# Patient Record
Sex: FEMALE | Race: WHITE | NOT HISPANIC OR LATINO | URBAN - METROPOLITAN AREA
[De-identification: names, ages, dates, MRNs, and addresses within clinical notes are randomized per-mention and may not be internally consistent; named-entity substitution may affect disease eponyms.]

---

## 2020-04-22 ENCOUNTER — TELEPHONE (OUTPATIENT)
Dept: OBSTETRICS AND GYNECOLOGY | Facility: CLINIC | Age: 35
End: 2020-04-22

## 2020-04-22 NOTE — TELEPHONE ENCOUNTER
----- Message from Iwona Iraheta sent at 4/22/2020 10:37 AM CDT -----  Contact: Patient  Patient calling regarding refill  Of meds. Call back [phone].

## 2020-04-28 RX ORDER — NORETHINDRONE ACETATE AND ETHINYL ESTRADIOL AND FERROUS FUMARATE 1MG-20(24)
1 KIT ORAL DAILY
COMMUNITY
End: 2020-04-28 | Stop reason: SDUPTHER

## 2020-04-28 RX ORDER — NORETHINDRONE ACETATE AND ETHINYL ESTRADIOL AND FERROUS FUMARATE 1MG-20(24)
1 KIT ORAL DAILY
Qty: 84 TABLET | Refills: 1 | Status: SHIPPED | OUTPATIENT
Start: 2020-04-28 | End: 2021-01-08

## 2020-04-28 NOTE — TELEPHONE ENCOUNTER
----- Message from Arpita Ramos sent at 4/28/2020  8:08 AM CDT -----  Contact: Patient   .Type:  RX Refill Request    Who Called:  Patient  Refill or New Rx: refill   RX Name and Strength: BC  How is the patient currently taking it? (ex. 1XDay): 1x daily   Is this a 30 day or 90 day RX: 90  Preferred Pharmacy with phone number: [facility] , [facility] did not want to fill it     Local or Mail Order: local   Ordering Provider: Jennie Doan   Would the patient rather a call back or a response via [facility]? call  Best Call Back Number: [phone]  Additional Information: n/a

## 2021-01-06 DIAGNOSIS — Z34.81 ENCOUNTER FOR SUPERVISION OF NORMAL PREGNANCY IN MULTIGRAVIDA IN FIRST TRIMESTER: Primary | ICD-10-CM

## 2021-01-08 ENCOUNTER — PROCEDURE VISIT (OUTPATIENT)
Dept: OBSTETRICS AND GYNECOLOGY | Facility: CLINIC | Age: 36
End: 2021-01-08
Payer: COMMERCIAL

## 2021-01-08 DIAGNOSIS — Z34.81 ENCOUNTER FOR SUPERVISION OF NORMAL PREGNANCY IN MULTIGRAVIDA IN FIRST TRIMESTER: ICD-10-CM

## 2021-01-08 DIAGNOSIS — Z34.81 ENCOUNTER FOR SUPERVISION OF NORMAL PREGNANCY IN MULTIGRAVIDA IN FIRST TRIMESTER: Primary | ICD-10-CM

## 2021-01-08 LAB
ABS NRBC COUNT: 0 X 10 3/UL (ref 0–0.01)
ABSOLUTE BASOPHIL: 0.1 X 10 3/UL (ref 0–0.22)
ABSOLUTE EOSINOPHIL: 0.22 X 10 3/UL (ref 0.04–0.54)
ABSOLUTE IMMATURE GRAN: 0.04 X 10 3/UL (ref 0–0.04)
ABSOLUTE LYMPHOCYTE: 3.05 X 10 3/UL (ref 0.86–4.75)
ABSOLUTE MONOCYTE: 0.81 X 10 3/UL (ref 0.22–1.08)
ANTIBODY SCREEN: NEGATIVE
BASOPHILS NFR BLD: 1 % (ref 0.2–1.2)
BLOOD GROUPING: NORMAL
BLOOD TYPE (D): POSITIVE
EOSINOPHIL NFR BLD: 2.1 % (ref 0.7–7)
HBV SURFACE AG SERPL QL IA: NONREACTIVE
HCT VFR BLD AUTO: 43.5 % (ref 37–47)
HCV IGG SERPL QL IA: NONREACTIVE
HGB BLD-MCNC: 14.1 G/DL (ref 12–16)
HIV 1+2 AB+HIV1 P24 AG SERPL QL IA: NONREACTIVE
IMMATURE GRANULOCYTES: 0.4 % (ref 0–0.5)
LYMPHOCYTES NFR BLD: 29.1 % (ref 19.3–53.1)
MCH RBC QN AUTO: 29.5 PG (ref 27–32)
MCHC RBC AUTO-ENTMCNC: 32.4 G/DL (ref 32–36)
MCV RBC AUTO: 91 FL (ref 82–100)
MONOCYTES NFR BLD: 7.7 % (ref 4.7–12.5)
NEUTROPHILS # BLD AUTO: 6.25 X 10 3/UL (ref 2.15–7.56)
NEUTROPHILS NFR BLD: 59.7 %
NUCLEATED RED BLOOD CELLS: 0 /100 WBC (ref 0–0.2)
PLATELET # BLD AUTO: 351 X 10 3/UL (ref 135–400)
RBC # BLD AUTO: 4.78 X 10 6/UL (ref 4.2–5.4)
RDW-SD: 39.7 FL (ref 37–54)
RUBELLA IGG SCREEN: NORMAL
SICKLE CELL PREP: NEGATIVE
SYPHILIS TREPONEMAL ANTIBODY: NONREACTIVE
WBC # BLD: 10.47 X 10 3/UL (ref 4.3–10.8)

## 2021-01-08 PROCEDURE — 76817 PR US, OB, TRANSVAG APPROACH: ICD-10-PCS | Mod: S$GLB,,, | Performed by: OBSTETRICS & GYNECOLOGY

## 2021-01-08 PROCEDURE — 76817 TRANSVAGINAL US OBSTETRIC: CPT | Mod: S$GLB,,, | Performed by: OBSTETRICS & GYNECOLOGY

## 2021-01-08 RX ORDER — METFORMIN HYDROCHLORIDE 500 MG/1
500 TABLET, EXTENDED RELEASE ORAL
COMMUNITY
Start: 2020-12-06 | End: 2021-11-12 | Stop reason: SDUPTHER

## 2021-01-15 ENCOUNTER — ROUTINE PRENATAL (OUTPATIENT)
Dept: OBSTETRICS AND GYNECOLOGY | Facility: CLINIC | Age: 36
End: 2021-01-15
Payer: COMMERCIAL

## 2021-01-15 ENCOUNTER — PATIENT MESSAGE (OUTPATIENT)
Dept: OBSTETRICS AND GYNECOLOGY | Facility: CLINIC | Age: 36
End: 2021-01-15

## 2021-01-15 VITALS — DIASTOLIC BLOOD PRESSURE: 98 MMHG | WEIGHT: 233 LBS | SYSTOLIC BLOOD PRESSURE: 159 MMHG

## 2021-01-15 DIAGNOSIS — Z34.81 ENCOUNTER FOR SUPERVISION OF NORMAL PREGNANCY IN MULTIGRAVIDA IN FIRST TRIMESTER: Primary | ICD-10-CM

## 2021-01-15 DIAGNOSIS — O16.9 ELEVATED BLOOD PRESSURE AFFECTING PREGNANCY, ANTEPARTUM: ICD-10-CM

## 2021-01-15 DIAGNOSIS — O09.529 ADVANCED MATERNAL AGE IN MULTIGRAVIDA, UNSPECIFIED TRIMESTER: ICD-10-CM

## 2021-01-15 DIAGNOSIS — Z86.39 HISTORY OF DIABETES MELLITUS: ICD-10-CM

## 2021-01-15 LAB — GLUCOSE 1 HR POST 50 GM: 248 MG/DL

## 2021-01-15 PROCEDURE — 0500F INITIAL PRENATAL CARE VISIT: CPT | Mod: S$GLB,,, | Performed by: OBSTETRICS & GYNECOLOGY

## 2021-01-15 PROCEDURE — 0500F PR INITIAL PRENATAL CARE VISIT: ICD-10-PCS | Mod: S$GLB,,, | Performed by: OBSTETRICS & GYNECOLOGY

## 2021-01-18 ENCOUNTER — PATIENT MESSAGE (OUTPATIENT)
Dept: OBSTETRICS AND GYNECOLOGY | Facility: CLINIC | Age: 36
End: 2021-01-18

## 2021-01-19 DIAGNOSIS — O16.9 ELEVATED BLOOD PRESSURE AFFECTING PREGNANCY, ANTEPARTUM: Primary | ICD-10-CM

## 2021-01-19 RX ORDER — NIFEDIPINE 30 MG/1
30 TABLET, EXTENDED RELEASE ORAL DAILY
Qty: 30 TABLET | Refills: 11 | Status: SHIPPED | OUTPATIENT
Start: 2021-01-19 | End: 2021-02-23 | Stop reason: SDUPTHER

## 2021-01-20 LAB
CHLAMYDIA: NEGATIVE
GONORRHEA: NEGATIVE
SOURCE: NORMAL
SOURCE: NORMAL
TRICHOMONAS AMPLIFIED: NEGATIVE

## 2021-01-21 LAB — URINE CULTURE, ROUTINE: NORMAL

## 2021-01-25 ENCOUNTER — PATIENT MESSAGE (OUTPATIENT)
Dept: OBSTETRICS AND GYNECOLOGY | Facility: CLINIC | Age: 36
End: 2021-01-25

## 2021-01-27 ENCOUNTER — PATIENT MESSAGE (OUTPATIENT)
Dept: OBSTETRICS AND GYNECOLOGY | Facility: CLINIC | Age: 36
End: 2021-01-27

## 2021-02-16 ENCOUNTER — PATIENT MESSAGE (OUTPATIENT)
Dept: OBSTETRICS AND GYNECOLOGY | Facility: CLINIC | Age: 36
End: 2021-02-16

## 2021-02-22 ENCOUNTER — ROUTINE PRENATAL (OUTPATIENT)
Dept: OBSTETRICS AND GYNECOLOGY | Facility: CLINIC | Age: 36
End: 2021-02-22
Payer: COMMERCIAL

## 2021-02-22 ENCOUNTER — PATIENT MESSAGE (OUTPATIENT)
Dept: OBSTETRICS AND GYNECOLOGY | Facility: CLINIC | Age: 36
End: 2021-02-22

## 2021-02-22 VITALS — SYSTOLIC BLOOD PRESSURE: 132 MMHG | HEART RATE: 89 BPM | DIASTOLIC BLOOD PRESSURE: 84 MMHG | WEIGHT: 227 LBS

## 2021-02-22 DIAGNOSIS — O16.9 ELEVATED BLOOD PRESSURE AFFECTING PREGNANCY, ANTEPARTUM: ICD-10-CM

## 2021-02-22 DIAGNOSIS — Z34.81 ENCOUNTER FOR SUPERVISION OF NORMAL PREGNANCY IN MULTIGRAVIDA IN FIRST TRIMESTER: Primary | ICD-10-CM

## 2021-02-22 DIAGNOSIS — O09.529 ADVANCED MATERNAL AGE IN MULTIGRAVIDA, UNSPECIFIED TRIMESTER: ICD-10-CM

## 2021-02-22 DIAGNOSIS — Z86.39 HISTORY OF DIABETES MELLITUS: ICD-10-CM

## 2021-02-22 PROCEDURE — 0502F PR SUBSEQUENT PRENATAL CARE: ICD-10-PCS | Mod: CPTII,S$GLB,, | Performed by: OBSTETRICS & GYNECOLOGY

## 2021-02-22 PROCEDURE — 0502F SUBSEQUENT PRENATAL CARE: CPT | Mod: CPTII,S$GLB,, | Performed by: OBSTETRICS & GYNECOLOGY

## 2021-02-23 DIAGNOSIS — O16.9 ELEVATED BLOOD PRESSURE AFFECTING PREGNANCY, ANTEPARTUM: ICD-10-CM

## 2021-02-23 RX ORDER — NIFEDIPINE 30 MG/1
30 TABLET, EXTENDED RELEASE ORAL DAILY
Qty: 90 TABLET | Refills: 4 | Status: SHIPPED | OUTPATIENT
Start: 2021-02-23 | End: 2021-05-24

## 2021-03-03 ENCOUNTER — PATIENT MESSAGE (OUTPATIENT)
Dept: OBSTETRICS AND GYNECOLOGY | Facility: CLINIC | Age: 36
End: 2021-03-03

## 2021-03-05 ENCOUNTER — ROUTINE PRENATAL (OUTPATIENT)
Dept: OBSTETRICS AND GYNECOLOGY | Facility: CLINIC | Age: 36
End: 2021-03-05
Payer: COMMERCIAL

## 2021-03-05 ENCOUNTER — PATIENT MESSAGE (OUTPATIENT)
Dept: OBSTETRICS AND GYNECOLOGY | Facility: CLINIC | Age: 36
End: 2021-03-05

## 2021-03-05 VITALS — WEIGHT: 225 LBS | DIASTOLIC BLOOD PRESSURE: 84 MMHG | SYSTOLIC BLOOD PRESSURE: 137 MMHG | HEART RATE: 90 BPM

## 2021-03-05 DIAGNOSIS — Z34.02 ENCOUNTER FOR SUPERVISION OF NORMAL FIRST PREGNANCY IN SECOND TRIMESTER: ICD-10-CM

## 2021-03-05 DIAGNOSIS — Z86.39 HISTORY OF DIABETES MELLITUS: ICD-10-CM

## 2021-03-05 DIAGNOSIS — O16.9 ELEVATED BLOOD PRESSURE AFFECTING PREGNANCY, ANTEPARTUM: ICD-10-CM

## 2021-03-05 DIAGNOSIS — O09.529 ADVANCED MATERNAL AGE IN MULTIGRAVIDA, UNSPECIFIED TRIMESTER: Primary | ICD-10-CM

## 2021-03-05 PROCEDURE — 0502F PR SUBSEQUENT PRENATAL CARE: ICD-10-PCS | Mod: CPTII,S$GLB,, | Performed by: OBSTETRICS & GYNECOLOGY

## 2021-03-05 PROCEDURE — 0502F SUBSEQUENT PRENATAL CARE: CPT | Mod: CPTII,S$GLB,, | Performed by: OBSTETRICS & GYNECOLOGY

## 2021-03-05 RX ORDER — INSULIN HUMAN 100 [IU]/ML
INJECTION, SUSPENSION SUBCUTANEOUS
Qty: 10 ML | Refills: 3 | Status: SHIPPED | OUTPATIENT
Start: 2021-03-05 | End: 2021-03-05

## 2021-03-05 RX ORDER — NAPROXEN SODIUM 220 MG/1
81 TABLET, FILM COATED ORAL DAILY
COMMUNITY

## 2021-03-09 ENCOUNTER — PATIENT MESSAGE (OUTPATIENT)
Dept: OBSTETRICS AND GYNECOLOGY | Facility: CLINIC | Age: 36
End: 2021-03-09

## 2021-03-12 ENCOUNTER — TELEPHONE (OUTPATIENT)
Dept: OBSTETRICS AND GYNECOLOGY | Facility: CLINIC | Age: 36
End: 2021-03-12

## 2021-03-16 ENCOUNTER — PATIENT MESSAGE (OUTPATIENT)
Dept: OBSTETRICS AND GYNECOLOGY | Facility: CLINIC | Age: 36
End: 2021-03-16

## 2021-03-16 ENCOUNTER — OFFICE VISIT (OUTPATIENT)
Dept: OBSTETRICS AND GYNECOLOGY | Facility: CLINIC | Age: 36
End: 2021-03-16
Payer: COMMERCIAL

## 2021-03-16 DIAGNOSIS — O09.529 ADVANCED MATERNAL AGE IN MULTIGRAVIDA, UNSPECIFIED TRIMESTER: Primary | ICD-10-CM

## 2021-03-16 DIAGNOSIS — Z34.02 ENCOUNTER FOR SUPERVISION OF NORMAL FIRST PREGNANCY IN SECOND TRIMESTER: ICD-10-CM

## 2021-03-16 DIAGNOSIS — Z86.39 HISTORY OF DIABETES MELLITUS: ICD-10-CM

## 2021-03-16 DIAGNOSIS — O16.9 ELEVATED BLOOD PRESSURE AFFECTING PREGNANCY, ANTEPARTUM: ICD-10-CM

## 2021-03-16 PROCEDURE — 0502F SUBSEQUENT PRENATAL CARE: CPT | Mod: CPTII,,, | Performed by: OBSTETRICS & GYNECOLOGY

## 2021-03-16 PROCEDURE — 0502F PR SUBSEQUENT PRENATAL CARE: ICD-10-PCS | Mod: CPTII,,, | Performed by: OBSTETRICS & GYNECOLOGY

## 2021-03-22 ENCOUNTER — PATIENT MESSAGE (OUTPATIENT)
Dept: OBSTETRICS AND GYNECOLOGY | Facility: CLINIC | Age: 36
End: 2021-03-22

## 2021-03-29 DIAGNOSIS — O24.414 INSULIN CONTROLLED GESTATIONAL DIABETES MELLITUS (GDM) IN SECOND TRIMESTER: Primary | ICD-10-CM

## 2021-04-01 ENCOUNTER — OFFICE VISIT (OUTPATIENT)
Dept: MATERNAL FETAL MEDICINE | Facility: CLINIC | Age: 36
End: 2021-04-01
Payer: COMMERCIAL

## 2021-04-01 VITALS
BODY MASS INDEX: 34.84 KG/M2 | SYSTOLIC BLOOD PRESSURE: 120 MMHG | HEART RATE: 88 BPM | DIASTOLIC BLOOD PRESSURE: 80 MMHG | HEIGHT: 67 IN | WEIGHT: 222 LBS | OXYGEN SATURATION: 98 % | RESPIRATION RATE: 20 BRPM

## 2021-04-01 DIAGNOSIS — O24.414 INSULIN CONTROLLED GESTATIONAL DIABETES MELLITUS (GDM) IN SECOND TRIMESTER: ICD-10-CM

## 2021-04-01 PROCEDURE — 76811 PR US, OB FETAL EVAL & EXAM, TRANSABDOM,FIRST GESTATION: ICD-10-PCS | Mod: S$GLB,,, | Performed by: OBSTETRICS & GYNECOLOGY

## 2021-04-01 PROCEDURE — 99205 PR OFFICE/OUTPT VISIT, NEW, LEVL V, 60-74 MIN: ICD-10-PCS | Mod: 25,S$GLB,, | Performed by: OBSTETRICS & GYNECOLOGY

## 2021-04-01 PROCEDURE — 3008F BODY MASS INDEX DOCD: CPT | Mod: CPTII,S$GLB,, | Performed by: OBSTETRICS & GYNECOLOGY

## 2021-04-01 PROCEDURE — 99205 OFFICE O/P NEW HI 60 MIN: CPT | Mod: 25,S$GLB,, | Performed by: OBSTETRICS & GYNECOLOGY

## 2021-04-01 PROCEDURE — 76811 OB US DETAILED SNGL FETUS: CPT | Mod: S$GLB,,, | Performed by: OBSTETRICS & GYNECOLOGY

## 2021-04-01 PROCEDURE — 3008F PR BODY MASS INDEX (BMI) DOCUMENTED: ICD-10-PCS | Mod: CPTII,S$GLB,, | Performed by: OBSTETRICS & GYNECOLOGY

## 2021-04-09 ENCOUNTER — PATIENT MESSAGE (OUTPATIENT)
Dept: OBSTETRICS AND GYNECOLOGY | Facility: CLINIC | Age: 36
End: 2021-04-09

## 2021-04-09 DIAGNOSIS — Z86.39 HISTORY OF DIABETES MELLITUS: ICD-10-CM

## 2021-04-09 RX ORDER — HUMAN INSULIN 100 [IU]/ML
INJECTION, SUSPENSION SUBCUTANEOUS
Qty: 1 VIAL | Refills: 3 | Status: SHIPPED | OUTPATIENT
Start: 2021-04-09 | End: 2021-04-26 | Stop reason: SDUPTHER

## 2021-04-14 ENCOUNTER — TELEPHONE (OUTPATIENT)
Dept: OBSTETRICS AND GYNECOLOGY | Facility: CLINIC | Age: 36
End: 2021-04-14

## 2021-04-15 ENCOUNTER — ROUTINE PRENATAL (OUTPATIENT)
Dept: OBSTETRICS AND GYNECOLOGY | Facility: CLINIC | Age: 36
End: 2021-04-15
Payer: COMMERCIAL

## 2021-04-15 VITALS
WEIGHT: 222 LBS | HEART RATE: 91 BPM | BODY MASS INDEX: 34.77 KG/M2 | DIASTOLIC BLOOD PRESSURE: 85 MMHG | SYSTOLIC BLOOD PRESSURE: 147 MMHG

## 2021-04-15 DIAGNOSIS — O09.529 ADVANCED MATERNAL AGE IN MULTIGRAVIDA, UNSPECIFIED TRIMESTER: ICD-10-CM

## 2021-04-15 DIAGNOSIS — Z34.02 ENCOUNTER FOR SUPERVISION OF NORMAL FIRST PREGNANCY IN SECOND TRIMESTER: Primary | ICD-10-CM

## 2021-04-15 DIAGNOSIS — Z86.39 HISTORY OF DIABETES MELLITUS: ICD-10-CM

## 2021-04-15 DIAGNOSIS — O16.9 ELEVATED BLOOD PRESSURE AFFECTING PREGNANCY, ANTEPARTUM: ICD-10-CM

## 2021-04-15 PROCEDURE — 0502F SUBSEQUENT PRENATAL CARE: CPT | Mod: CPTII,S$GLB,, | Performed by: OBSTETRICS & GYNECOLOGY

## 2021-04-15 PROCEDURE — 0502F PR SUBSEQUENT PRENATAL CARE: ICD-10-PCS | Mod: CPTII,S$GLB,, | Performed by: OBSTETRICS & GYNECOLOGY

## 2021-04-20 ENCOUNTER — PATIENT MESSAGE (OUTPATIENT)
Dept: OBSTETRICS AND GYNECOLOGY | Facility: CLINIC | Age: 36
End: 2021-04-20

## 2021-04-26 ENCOUNTER — PATIENT MESSAGE (OUTPATIENT)
Dept: OBSTETRICS AND GYNECOLOGY | Facility: CLINIC | Age: 36
End: 2021-04-26

## 2021-04-26 DIAGNOSIS — Z86.39 HISTORY OF DIABETES MELLITUS: ICD-10-CM

## 2021-04-26 DIAGNOSIS — O24.414 INSULIN CONTROLLED GESTATIONAL DIABETES MELLITUS (GDM) IN SECOND TRIMESTER: Primary | ICD-10-CM

## 2021-04-27 RX ORDER — HUMAN INSULIN 100 [IU]/ML
INJECTION, SUSPENSION SUBCUTANEOUS
Qty: 1 VIAL | Refills: 3 | Status: SHIPPED | OUTPATIENT
Start: 2021-04-27 | End: 2023-10-12

## 2021-04-29 ENCOUNTER — PROCEDURE VISIT (OUTPATIENT)
Dept: MATERNAL FETAL MEDICINE | Facility: CLINIC | Age: 36
End: 2021-04-29
Payer: COMMERCIAL

## 2021-04-29 VITALS
OXYGEN SATURATION: 99 % | RESPIRATION RATE: 20 BRPM | BODY MASS INDEX: 34.77 KG/M2 | WEIGHT: 222 LBS | SYSTOLIC BLOOD PRESSURE: 130 MMHG | DIASTOLIC BLOOD PRESSURE: 60 MMHG | HEART RATE: 100 BPM

## 2021-04-29 DIAGNOSIS — O24.414 INSULIN CONTROLLED GESTATIONAL DIABETES MELLITUS (GDM) IN SECOND TRIMESTER: ICD-10-CM

## 2021-04-29 PROCEDURE — 99213 PR OFFICE/OUTPT VISIT, EST, LEVL III, 20-29 MIN: ICD-10-PCS | Mod: 25,S$GLB,, | Performed by: OBSTETRICS & GYNECOLOGY

## 2021-04-29 PROCEDURE — 76816 PR  US,PREGNANT UTERUS,F/U,TRANSABD APP: ICD-10-PCS | Mod: S$GLB,,, | Performed by: OBSTETRICS & GYNECOLOGY

## 2021-04-29 PROCEDURE — 76816 OB US FOLLOW-UP PER FETUS: CPT | Mod: S$GLB,,, | Performed by: OBSTETRICS & GYNECOLOGY

## 2021-04-29 PROCEDURE — 99213 OFFICE O/P EST LOW 20 MIN: CPT | Mod: 25,S$GLB,, | Performed by: OBSTETRICS & GYNECOLOGY

## 2021-05-04 ENCOUNTER — TELEPHONE (OUTPATIENT)
Dept: OBSTETRICS AND GYNECOLOGY | Facility: CLINIC | Age: 36
End: 2021-05-04

## 2021-05-05 ENCOUNTER — ROUTINE PRENATAL (OUTPATIENT)
Dept: OBSTETRICS AND GYNECOLOGY | Facility: CLINIC | Age: 36
End: 2021-05-05
Payer: COMMERCIAL

## 2021-05-05 VITALS
WEIGHT: 225 LBS | SYSTOLIC BLOOD PRESSURE: 137 MMHG | BODY MASS INDEX: 35.24 KG/M2 | DIASTOLIC BLOOD PRESSURE: 97 MMHG | HEART RATE: 91 BPM

## 2021-05-05 DIAGNOSIS — O16.9 ELEVATED BLOOD PRESSURE AFFECTING PREGNANCY, ANTEPARTUM: ICD-10-CM

## 2021-05-05 DIAGNOSIS — O09.529 ADVANCED MATERNAL AGE IN MULTIGRAVIDA, UNSPECIFIED TRIMESTER: ICD-10-CM

## 2021-05-05 DIAGNOSIS — Z86.39 HISTORY OF DIABETES MELLITUS: ICD-10-CM

## 2021-05-05 DIAGNOSIS — Z34.02 ENCOUNTER FOR SUPERVISION OF NORMAL FIRST PREGNANCY IN SECOND TRIMESTER: Primary | ICD-10-CM

## 2021-05-05 PROCEDURE — 0502F SUBSEQUENT PRENATAL CARE: CPT | Mod: CPTII,S$GLB,, | Performed by: OBSTETRICS & GYNECOLOGY

## 2021-05-05 PROCEDURE — 0502F PR SUBSEQUENT PRENATAL CARE: ICD-10-PCS | Mod: CPTII,S$GLB,, | Performed by: OBSTETRICS & GYNECOLOGY

## 2021-05-10 ENCOUNTER — PATIENT MESSAGE (OUTPATIENT)
Dept: OBSTETRICS AND GYNECOLOGY | Facility: CLINIC | Age: 36
End: 2021-05-10

## 2021-05-24 DIAGNOSIS — O24.414 INSULIN CONTROLLED GESTATIONAL DIABETES MELLITUS (GDM) IN SECOND TRIMESTER: Primary | ICD-10-CM

## 2021-05-27 ENCOUNTER — ROUTINE PRENATAL (OUTPATIENT)
Dept: OBSTETRICS AND GYNECOLOGY | Facility: CLINIC | Age: 36
End: 2021-05-27
Payer: COMMERCIAL

## 2021-05-27 ENCOUNTER — PROCEDURE VISIT (OUTPATIENT)
Dept: MATERNAL FETAL MEDICINE | Facility: CLINIC | Age: 36
End: 2021-05-27
Payer: COMMERCIAL

## 2021-05-27 VITALS
OXYGEN SATURATION: 99 % | HEART RATE: 94 BPM | WEIGHT: 226 LBS | WEIGHT: 226 LBS | DIASTOLIC BLOOD PRESSURE: 76 MMHG | DIASTOLIC BLOOD PRESSURE: 76 MMHG | RESPIRATION RATE: 20 BRPM | HEART RATE: 94 BPM | BODY MASS INDEX: 35.4 KG/M2 | BODY MASS INDEX: 35.4 KG/M2 | SYSTOLIC BLOOD PRESSURE: 120 MMHG | SYSTOLIC BLOOD PRESSURE: 120 MMHG

## 2021-05-27 DIAGNOSIS — O24.414 INSULIN CONTROLLED GESTATIONAL DIABETES MELLITUS (GDM) IN SECOND TRIMESTER: ICD-10-CM

## 2021-05-27 DIAGNOSIS — Z34.02 ENCOUNTER FOR SUPERVISION OF NORMAL FIRST PREGNANCY IN SECOND TRIMESTER: Primary | ICD-10-CM

## 2021-05-27 DIAGNOSIS — Z86.39 HISTORY OF DIABETES MELLITUS: ICD-10-CM

## 2021-05-27 DIAGNOSIS — O09.529 ADVANCED MATERNAL AGE IN MULTIGRAVIDA, UNSPECIFIED TRIMESTER: ICD-10-CM

## 2021-05-27 DIAGNOSIS — O16.9 ELEVATED BLOOD PRESSURE AFFECTING PREGNANCY, ANTEPARTUM: ICD-10-CM

## 2021-05-27 PROCEDURE — 99213 PR OFFICE/OUTPT VISIT, EST, LEVL III, 20-29 MIN: ICD-10-PCS | Mod: 25,S$GLB,, | Performed by: OBSTETRICS & GYNECOLOGY

## 2021-05-27 PROCEDURE — 99213 OFFICE O/P EST LOW 20 MIN: CPT | Mod: 25,S$GLB,, | Performed by: OBSTETRICS & GYNECOLOGY

## 2021-05-27 PROCEDURE — 76816 PR  US,PREGNANT UTERUS,F/U,TRANSABD APP: ICD-10-PCS | Mod: S$GLB,,, | Performed by: OBSTETRICS & GYNECOLOGY

## 2021-05-27 PROCEDURE — 76819 FETAL BIOPHYS PROFIL W/O NST: CPT | Mod: S$GLB,,, | Performed by: OBSTETRICS & GYNECOLOGY

## 2021-05-27 PROCEDURE — 0502F PR SUBSEQUENT PRENATAL CARE: ICD-10-PCS | Mod: CPTII,S$GLB,, | Performed by: OBSTETRICS & GYNECOLOGY

## 2021-05-27 PROCEDURE — 76816 OB US FOLLOW-UP PER FETUS: CPT | Mod: S$GLB,,, | Performed by: OBSTETRICS & GYNECOLOGY

## 2021-05-27 PROCEDURE — 0502F SUBSEQUENT PRENATAL CARE: CPT | Mod: CPTII,S$GLB,, | Performed by: OBSTETRICS & GYNECOLOGY

## 2021-05-27 PROCEDURE — 76819 PR US, OB, FETAL BIOPHYSICAL, W/O NST: ICD-10-PCS | Mod: S$GLB,,, | Performed by: OBSTETRICS & GYNECOLOGY

## 2021-06-15 DIAGNOSIS — O24.414 INSULIN CONTROLLED GESTATIONAL DIABETES MELLITUS (GDM) IN SECOND TRIMESTER: Primary | ICD-10-CM

## 2021-06-15 DIAGNOSIS — O24.414 INSULIN CONTROLLED GESTATIONAL DIABETES MELLITUS (GDM) IN THIRD TRIMESTER: ICD-10-CM

## 2021-06-21 ENCOUNTER — PROCEDURE VISIT (OUTPATIENT)
Dept: MATERNAL FETAL MEDICINE | Facility: CLINIC | Age: 36
End: 2021-06-21
Payer: COMMERCIAL

## 2021-06-21 ENCOUNTER — ROUTINE PRENATAL (OUTPATIENT)
Dept: OBSTETRICS AND GYNECOLOGY | Facility: CLINIC | Age: 36
End: 2021-06-21
Payer: COMMERCIAL

## 2021-06-21 VITALS
HEART RATE: 100 BPM | DIASTOLIC BLOOD PRESSURE: 70 MMHG | DIASTOLIC BLOOD PRESSURE: 70 MMHG | OXYGEN SATURATION: 99 % | HEART RATE: 100 BPM | BODY MASS INDEX: 35.24 KG/M2 | RESPIRATION RATE: 20 BRPM | BODY MASS INDEX: 35.24 KG/M2 | WEIGHT: 225 LBS | WEIGHT: 225 LBS | SYSTOLIC BLOOD PRESSURE: 126 MMHG | SYSTOLIC BLOOD PRESSURE: 126 MMHG

## 2021-06-21 DIAGNOSIS — O16.9 ELEVATED BLOOD PRESSURE AFFECTING PREGNANCY, ANTEPARTUM: ICD-10-CM

## 2021-06-21 DIAGNOSIS — Z34.03 ENCOUNTER FOR SUPERVISION OF NORMAL FIRST PREGNANCY IN THIRD TRIMESTER: Primary | ICD-10-CM

## 2021-06-21 DIAGNOSIS — O09.529 ADVANCED MATERNAL AGE IN MULTIGRAVIDA, UNSPECIFIED TRIMESTER: ICD-10-CM

## 2021-06-21 DIAGNOSIS — Z86.39 HISTORY OF DIABETES MELLITUS: ICD-10-CM

## 2021-06-21 DIAGNOSIS — O24.414 INSULIN CONTROLLED GESTATIONAL DIABETES MELLITUS (GDM) IN THIRD TRIMESTER: ICD-10-CM

## 2021-06-21 PROCEDURE — 90471 TDAP VACCINE GREATER THAN OR EQUAL TO 7YO IM: ICD-10-PCS | Mod: S$GLB,,, | Performed by: OBSTETRICS & GYNECOLOGY

## 2021-06-21 PROCEDURE — 76816 OB US FOLLOW-UP PER FETUS: CPT | Mod: S$GLB,,, | Performed by: OBSTETRICS & GYNECOLOGY

## 2021-06-21 PROCEDURE — 76819 PR US, OB, FETAL BIOPHYSICAL, W/O NST: ICD-10-PCS | Mod: S$GLB,,, | Performed by: OBSTETRICS & GYNECOLOGY

## 2021-06-21 PROCEDURE — 90715 TDAP VACCINE 7 YRS/> IM: CPT | Mod: S$GLB,,, | Performed by: OBSTETRICS & GYNECOLOGY

## 2021-06-21 PROCEDURE — 0502F PR SUBSEQUENT PRENATAL CARE: ICD-10-PCS | Mod: CPTII,S$GLB,, | Performed by: OBSTETRICS & GYNECOLOGY

## 2021-06-21 PROCEDURE — 0502F SUBSEQUENT PRENATAL CARE: CPT | Mod: CPTII,S$GLB,, | Performed by: OBSTETRICS & GYNECOLOGY

## 2021-06-21 PROCEDURE — 90471 IMMUNIZATION ADMIN: CPT | Mod: S$GLB,,, | Performed by: OBSTETRICS & GYNECOLOGY

## 2021-06-21 PROCEDURE — 76819 FETAL BIOPHYS PROFIL W/O NST: CPT | Mod: S$GLB,,, | Performed by: OBSTETRICS & GYNECOLOGY

## 2021-06-21 PROCEDURE — 99214 PR OFFICE/OUTPT VISIT, EST, LEVL IV, 30-39 MIN: ICD-10-PCS | Mod: 25,S$GLB,, | Performed by: OBSTETRICS & GYNECOLOGY

## 2021-06-21 PROCEDURE — 90715 TDAP VACCINE GREATER THAN OR EQUAL TO 7YO IM: ICD-10-PCS | Mod: S$GLB,,, | Performed by: OBSTETRICS & GYNECOLOGY

## 2021-06-21 PROCEDURE — 76816 PR  US,PREGNANT UTERUS,F/U,TRANSABD APP: ICD-10-PCS | Mod: S$GLB,,, | Performed by: OBSTETRICS & GYNECOLOGY

## 2021-06-21 PROCEDURE — 99214 OFFICE O/P EST MOD 30 MIN: CPT | Mod: 25,S$GLB,, | Performed by: OBSTETRICS & GYNECOLOGY

## 2021-06-22 LAB
ABS NRBC COUNT: 0 X 10 3/UL (ref 0–0.01)
ABSOLUTE BASOPHIL: 0.06 X 10 3/UL (ref 0–0.22)
ABSOLUTE EOSINOPHIL: 0.14 X 10 3/UL (ref 0.04–0.54)
ABSOLUTE IMMATURE GRAN: 0.06 X 10 3/UL (ref 0–0.04)
ABSOLUTE LYMPHOCYTE: 2.07 X 10 3/UL (ref 0.86–4.75)
ABSOLUTE MONOCYTE: 0.79 X 10 3/UL (ref 0.22–1.08)
BASOPHILS NFR BLD: 0.6 % (ref 0.2–1.2)
EOSINOPHIL NFR BLD: 1.4 % (ref 0.7–7)
HCT VFR BLD AUTO: 36 % (ref 37–47)
HGB BLD-MCNC: 12.1 G/DL (ref 12–16)
IMMATURE GRANULOCYTES: 0.6 % (ref 0–0.5)
LYMPHOCYTES NFR BLD: 21.2 % (ref 19.3–53.1)
MCH RBC QN AUTO: 29.7 PG (ref 27–32)
MCHC RBC AUTO-ENTMCNC: 33.6 G/DL (ref 32–36)
MCV RBC AUTO: 88.2 FL (ref 82–100)
MONOCYTES NFR BLD: 8.1 % (ref 4.7–12.5)
NEUTROPHILS # BLD AUTO: 6.66 X 10 3/UL (ref 2.15–7.56)
NEUTROPHILS NFR BLD: 68.1 %
NUCLEATED RED BLOOD CELLS: 0 /100 WBC (ref 0–0.2)
PLATELET # BLD AUTO: 309 X 10 3/UL (ref 135–400)
RBC # BLD AUTO: 4.08 X 10 6/UL (ref 4.2–5.4)
RDW-SD: 43.2 FL (ref 37–54)
WBC # BLD: 9.78 X 10 3/UL (ref 4.3–10.8)

## 2021-07-06 ENCOUNTER — ROUTINE PRENATAL (OUTPATIENT)
Dept: OBSTETRICS AND GYNECOLOGY | Facility: CLINIC | Age: 36
End: 2021-07-06
Payer: COMMERCIAL

## 2021-07-06 VITALS
HEART RATE: 90 BPM | WEIGHT: 225 LBS | SYSTOLIC BLOOD PRESSURE: 124 MMHG | DIASTOLIC BLOOD PRESSURE: 83 MMHG | BODY MASS INDEX: 35.24 KG/M2

## 2021-07-06 DIAGNOSIS — Z34.03 ENCOUNTER FOR SUPERVISION OF NORMAL FIRST PREGNANCY IN THIRD TRIMESTER: Primary | ICD-10-CM

## 2021-07-06 DIAGNOSIS — O16.9 ELEVATED BLOOD PRESSURE AFFECTING PREGNANCY, ANTEPARTUM: ICD-10-CM

## 2021-07-06 DIAGNOSIS — Z86.39 HISTORY OF DIABETES MELLITUS: ICD-10-CM

## 2021-07-06 DIAGNOSIS — O09.529 ADVANCED MATERNAL AGE IN MULTIGRAVIDA, UNSPECIFIED TRIMESTER: ICD-10-CM

## 2021-07-06 PROCEDURE — 0502F SUBSEQUENT PRENATAL CARE: CPT | Mod: CPTII,S$GLB,, | Performed by: OBSTETRICS & GYNECOLOGY

## 2021-07-06 PROCEDURE — 0502F PR SUBSEQUENT PRENATAL CARE: ICD-10-PCS | Mod: CPTII,S$GLB,, | Performed by: OBSTETRICS & GYNECOLOGY

## 2021-07-15 ENCOUNTER — PROCEDURE VISIT (OUTPATIENT)
Dept: OBSTETRICS AND GYNECOLOGY | Facility: CLINIC | Age: 36
End: 2021-07-15
Payer: COMMERCIAL

## 2021-07-15 DIAGNOSIS — Z86.39 HISTORY OF DIABETES MELLITUS: ICD-10-CM

## 2021-07-15 DIAGNOSIS — Z34.03 ENCOUNTER FOR SUPERVISION OF NORMAL FIRST PREGNANCY IN THIRD TRIMESTER: ICD-10-CM

## 2021-07-15 DIAGNOSIS — O09.529 ADVANCED MATERNAL AGE IN MULTIGRAVIDA, UNSPECIFIED TRIMESTER: ICD-10-CM

## 2021-07-15 DIAGNOSIS — Z34.03 ENCOUNTER FOR SUPERVISION OF NORMAL FIRST PREGNANCY IN THIRD TRIMESTER: Primary | ICD-10-CM

## 2021-07-15 DIAGNOSIS — O16.9 ELEVATED BLOOD PRESSURE AFFECTING PREGNANCY, ANTEPARTUM: ICD-10-CM

## 2021-07-15 PROCEDURE — 76819 PR US, OB, FETAL BIOPHYSICAL, W/O NST: ICD-10-PCS | Mod: S$GLB,,, | Performed by: OBSTETRICS & GYNECOLOGY

## 2021-07-15 PROCEDURE — 76819 FETAL BIOPHYS PROFIL W/O NST: CPT | Mod: S$GLB,,, | Performed by: OBSTETRICS & GYNECOLOGY

## 2021-07-16 ENCOUNTER — PATIENT MESSAGE (OUTPATIENT)
Dept: OBSTETRICS AND GYNECOLOGY | Facility: CLINIC | Age: 36
End: 2021-07-16

## 2021-07-21 ENCOUNTER — TELEPHONE (OUTPATIENT)
Dept: OBSTETRICS AND GYNECOLOGY | Facility: CLINIC | Age: 36
End: 2021-07-21

## 2021-07-21 DIAGNOSIS — O09.529 ADVANCED MATERNAL AGE IN MULTIGRAVIDA, UNSPECIFIED TRIMESTER: ICD-10-CM

## 2021-07-21 DIAGNOSIS — Z34.03 ENCOUNTER FOR SUPERVISION OF NORMAL FIRST PREGNANCY IN THIRD TRIMESTER: Primary | ICD-10-CM

## 2021-07-21 DIAGNOSIS — O16.9 ELEVATED BLOOD PRESSURE AFFECTING PREGNANCY, ANTEPARTUM: ICD-10-CM

## 2021-07-21 DIAGNOSIS — Z86.39 HISTORY OF DIABETES MELLITUS: ICD-10-CM

## 2021-07-22 ENCOUNTER — PROCEDURE VISIT (OUTPATIENT)
Dept: OBSTETRICS AND GYNECOLOGY | Facility: CLINIC | Age: 36
End: 2021-07-22
Payer: COMMERCIAL

## 2021-07-22 ENCOUNTER — ROUTINE PRENATAL (OUTPATIENT)
Dept: OBSTETRICS AND GYNECOLOGY | Facility: CLINIC | Age: 36
End: 2021-07-22
Payer: COMMERCIAL

## 2021-07-22 VITALS
HEART RATE: 90 BPM | WEIGHT: 228 LBS | BODY MASS INDEX: 35.71 KG/M2 | DIASTOLIC BLOOD PRESSURE: 93 MMHG | SYSTOLIC BLOOD PRESSURE: 150 MMHG

## 2021-07-22 DIAGNOSIS — O09.529 ADVANCED MATERNAL AGE IN MULTIGRAVIDA, UNSPECIFIED TRIMESTER: ICD-10-CM

## 2021-07-22 DIAGNOSIS — O16.9 ELEVATED BLOOD PRESSURE AFFECTING PREGNANCY, ANTEPARTUM: ICD-10-CM

## 2021-07-22 DIAGNOSIS — Z86.39 HISTORY OF DIABETES MELLITUS: ICD-10-CM

## 2021-07-22 DIAGNOSIS — Z34.03 ENCOUNTER FOR SUPERVISION OF NORMAL FIRST PREGNANCY IN THIRD TRIMESTER: Primary | ICD-10-CM

## 2021-07-22 DIAGNOSIS — Z34.03 ENCOUNTER FOR SUPERVISION OF NORMAL FIRST PREGNANCY IN THIRD TRIMESTER: ICD-10-CM

## 2021-07-22 PROCEDURE — 76819 FETAL BIOPHYS PROFIL W/O NST: CPT | Mod: S$GLB,,, | Performed by: OBSTETRICS & GYNECOLOGY

## 2021-07-22 PROCEDURE — 76819 PR US, OB, FETAL BIOPHYSICAL, W/O NST: ICD-10-PCS | Mod: S$GLB,,, | Performed by: OBSTETRICS & GYNECOLOGY

## 2021-07-22 PROCEDURE — 0502F PR SUBSEQUENT PRENATAL CARE: ICD-10-PCS | Mod: CPTII,S$GLB,, | Performed by: OBSTETRICS & GYNECOLOGY

## 2021-07-22 PROCEDURE — 0502F SUBSEQUENT PRENATAL CARE: CPT | Mod: CPTII,S$GLB,, | Performed by: OBSTETRICS & GYNECOLOGY

## 2021-07-26 ENCOUNTER — ROUTINE PRENATAL (OUTPATIENT)
Dept: OBSTETRICS AND GYNECOLOGY | Facility: CLINIC | Age: 36
End: 2021-07-26
Payer: COMMERCIAL

## 2021-07-26 ENCOUNTER — PROCEDURE VISIT (OUTPATIENT)
Dept: MATERNAL FETAL MEDICINE | Facility: CLINIC | Age: 36
End: 2021-07-26
Payer: COMMERCIAL

## 2021-07-26 VITALS
SYSTOLIC BLOOD PRESSURE: 146 MMHG | SYSTOLIC BLOOD PRESSURE: 138 MMHG | WEIGHT: 226 LBS | OXYGEN SATURATION: 98 % | HEART RATE: 102 BPM | DIASTOLIC BLOOD PRESSURE: 92 MMHG | RESPIRATION RATE: 20 BRPM | WEIGHT: 226 LBS | DIASTOLIC BLOOD PRESSURE: 92 MMHG | BODY MASS INDEX: 35.4 KG/M2 | HEART RATE: 102 BPM | BODY MASS INDEX: 35.4 KG/M2

## 2021-07-26 DIAGNOSIS — O24.414 INSULIN CONTROLLED GESTATIONAL DIABETES MELLITUS (GDM) IN THIRD TRIMESTER: Primary | ICD-10-CM

## 2021-07-26 DIAGNOSIS — O09.529 ADVANCED MATERNAL AGE IN MULTIGRAVIDA, UNSPECIFIED TRIMESTER: ICD-10-CM

## 2021-07-26 DIAGNOSIS — O24.414 INSULIN CONTROLLED GESTATIONAL DIABETES MELLITUS (GDM) IN THIRD TRIMESTER: ICD-10-CM

## 2021-07-26 DIAGNOSIS — Z86.39 HISTORY OF DIABETES MELLITUS: ICD-10-CM

## 2021-07-26 DIAGNOSIS — O16.9 ELEVATED BLOOD PRESSURE AFFECTING PREGNANCY, ANTEPARTUM: ICD-10-CM

## 2021-07-26 DIAGNOSIS — Z34.03 ENCOUNTER FOR SUPERVISION OF NORMAL FIRST PREGNANCY IN THIRD TRIMESTER: Primary | ICD-10-CM

## 2021-07-26 DIAGNOSIS — O10.013 ESSENTIAL HYPERTENSION AFFECTING PREGNANCY IN THIRD TRIMESTER: ICD-10-CM

## 2021-07-26 PROCEDURE — 0502F PR SUBSEQUENT PRENATAL CARE: ICD-10-PCS | Mod: CPTII,S$GLB,, | Performed by: OBSTETRICS & GYNECOLOGY

## 2021-07-26 PROCEDURE — 76816 OB US FOLLOW-UP PER FETUS: CPT | Mod: S$GLB,,, | Performed by: OBSTETRICS & GYNECOLOGY

## 2021-07-26 PROCEDURE — 0502F SUBSEQUENT PRENATAL CARE: CPT | Mod: CPTII,S$GLB,, | Performed by: OBSTETRICS & GYNECOLOGY

## 2021-07-26 PROCEDURE — 76819 FETAL BIOPHYS PROFIL W/O NST: CPT | Mod: S$GLB,,, | Performed by: OBSTETRICS & GYNECOLOGY

## 2021-07-26 PROCEDURE — 76816 PR  US,PREGNANT UTERUS,F/U,TRANSABD APP: ICD-10-PCS | Mod: S$GLB,,, | Performed by: OBSTETRICS & GYNECOLOGY

## 2021-07-26 PROCEDURE — 76819 PR US, OB, FETAL BIOPHYSICAL, W/O NST: ICD-10-PCS | Mod: S$GLB,,, | Performed by: OBSTETRICS & GYNECOLOGY

## 2021-07-26 PROCEDURE — 99213 OFFICE O/P EST LOW 20 MIN: CPT | Mod: 25,S$GLB,, | Performed by: OBSTETRICS & GYNECOLOGY

## 2021-07-26 PROCEDURE — 99213 PR OFFICE/OUTPT VISIT, EST, LEVL III, 20-29 MIN: ICD-10-PCS | Mod: 25,S$GLB,, | Performed by: OBSTETRICS & GYNECOLOGY

## 2021-07-29 ENCOUNTER — ROUTINE PRENATAL (OUTPATIENT)
Dept: OBSTETRICS AND GYNECOLOGY | Facility: CLINIC | Age: 36
End: 2021-07-29
Payer: COMMERCIAL

## 2021-07-29 VITALS
WEIGHT: 226 LBS | HEART RATE: 93 BPM | BODY MASS INDEX: 35.4 KG/M2 | SYSTOLIC BLOOD PRESSURE: 129 MMHG | DIASTOLIC BLOOD PRESSURE: 81 MMHG

## 2021-07-29 DIAGNOSIS — Z86.39 HISTORY OF DIABETES MELLITUS: ICD-10-CM

## 2021-07-29 DIAGNOSIS — O16.9 ELEVATED BLOOD PRESSURE AFFECTING PREGNANCY, ANTEPARTUM: ICD-10-CM

## 2021-07-29 DIAGNOSIS — Z34.03 ENCOUNTER FOR SUPERVISION OF NORMAL FIRST PREGNANCY IN THIRD TRIMESTER: Primary | ICD-10-CM

## 2021-07-29 DIAGNOSIS — O09.529 ADVANCED MATERNAL AGE IN MULTIGRAVIDA, UNSPECIFIED TRIMESTER: ICD-10-CM

## 2021-07-29 PROCEDURE — 0502F SUBSEQUENT PRENATAL CARE: CPT | Mod: CPTII,S$GLB,, | Performed by: OBSTETRICS & GYNECOLOGY

## 2021-07-29 PROCEDURE — 0502F PR SUBSEQUENT PRENATAL CARE: ICD-10-PCS | Mod: CPTII,S$GLB,, | Performed by: OBSTETRICS & GYNECOLOGY

## 2021-07-30 LAB
GROUP B STREP MOLECULAR: POSITIVE
PENICILLIN ALLERGIC: ABNORMAL

## 2021-08-02 ENCOUNTER — TELEPHONE (OUTPATIENT)
Dept: OBSTETRICS AND GYNECOLOGY | Facility: CLINIC | Age: 36
End: 2021-08-02

## 2021-08-02 DIAGNOSIS — Z86.39 HISTORY OF DIABETES MELLITUS: ICD-10-CM

## 2021-08-02 DIAGNOSIS — O16.9 ELEVATED BLOOD PRESSURE AFFECTING PREGNANCY, ANTEPARTUM: ICD-10-CM

## 2021-08-02 DIAGNOSIS — Z34.03 ENCOUNTER FOR SUPERVISION OF NORMAL FIRST PREGNANCY IN THIRD TRIMESTER: Primary | ICD-10-CM

## 2021-08-03 ENCOUNTER — PROCEDURE VISIT (OUTPATIENT)
Dept: OBSTETRICS AND GYNECOLOGY | Facility: CLINIC | Age: 36
End: 2021-08-03
Payer: COMMERCIAL

## 2021-08-03 ENCOUNTER — ROUTINE PRENATAL (OUTPATIENT)
Dept: OBSTETRICS AND GYNECOLOGY | Facility: CLINIC | Age: 36
End: 2021-08-03
Payer: COMMERCIAL

## 2021-08-03 VITALS
WEIGHT: 227 LBS | HEART RATE: 96 BPM | BODY MASS INDEX: 35.55 KG/M2 | SYSTOLIC BLOOD PRESSURE: 164 MMHG | DIASTOLIC BLOOD PRESSURE: 83 MMHG

## 2021-08-03 DIAGNOSIS — Z34.03 ENCOUNTER FOR SUPERVISION OF NORMAL FIRST PREGNANCY IN THIRD TRIMESTER: ICD-10-CM

## 2021-08-03 DIAGNOSIS — O16.9 ELEVATED BLOOD PRESSURE AFFECTING PREGNANCY, ANTEPARTUM: ICD-10-CM

## 2021-08-03 DIAGNOSIS — Z86.39 HISTORY OF DIABETES MELLITUS: ICD-10-CM

## 2021-08-03 DIAGNOSIS — O09.529 ADVANCED MATERNAL AGE IN MULTIGRAVIDA, UNSPECIFIED TRIMESTER: ICD-10-CM

## 2021-08-03 DIAGNOSIS — Z34.03 ENCOUNTER FOR SUPERVISION OF NORMAL FIRST PREGNANCY IN THIRD TRIMESTER: Primary | ICD-10-CM

## 2021-08-03 LAB
ALBUMIN SERPL BCP-MCNC: 2.6 G/DL (ref 3.4–5)
ALBUMIN/GLOBULIN RATIO: 0.68 RATIO (ref 1.1–1.8)
ALP SERPL-CCNC: 103 U/L (ref 46–116)
ALT SERPL W P-5'-P-CCNC: 13 U/L (ref 12–78)
ANION GAP SERPL CALC-SCNC: 10 MMOL/L (ref 3–11)
APPEARANCE, UA: CLEAR
AST SERPL-CCNC: 12 U/L (ref 15–37)
BASOPHILS NFR BLD: 0.4 % (ref 0–3)
BILIRUB SERPL-MCNC: 0.5 MG/DL (ref 0–1)
BILIRUB UR QL STRIP: NEGATIVE MG/DL
BUN SERPL-MCNC: 12 MG/DL (ref 7–18)
BUN/CREAT SERPL: 28.57 RATIO (ref 7–18)
CALCIUM SERPL-MCNC: 8.7 MG/DL (ref 8.8–10.5)
CHLORIDE SERPL-SCNC: 105 MMOL/L (ref 100–108)
CO2 SERPL-SCNC: 22 MMOL/L (ref 21–32)
COLOR UR: ABNORMAL
CREAT SERPL-MCNC: 0.42 MG/DL (ref 0.55–1.02)
CREATININE, RANDOM URINE: 65 MG/DL (ref 10–175)
EOSINOPHIL NFR BLD: 0.8 % (ref 1–3)
ERYTHROCYTE [DISTWIDTH] IN BLOOD BY AUTOMATED COUNT: 14.6 % (ref 12.5–18)
GFR ESTIMATION: > 60
GLOBULIN: 3.8 G/DL (ref 2.3–3.5)
GLUCOSE (UA): NORMAL MG/DL
GLUCOSE SERPL-MCNC: 106 MG/DL (ref 70–110)
HCT VFR BLD AUTO: 36.5 % (ref 37–47)
HGB BLD-MCNC: 12.4 G/DL (ref 12–16)
HGB UR QL STRIP: NEGATIVE /UL
KETONES UR QL STRIP: 50 MG/DL
LDH SERPL L TO P-CCNC: 121 U/L (ref 82–234)
LEUKOCYTE ESTERASE UR QL STRIP: NEGATIVE /UL
LYMPHOCYTES NFR BLD: 20.3 % (ref 25–40)
MCH RBC QN AUTO: 30 PG (ref 27–31.2)
MCHC RBC AUTO-ENTMCNC: 34 G/DL (ref 31.8–35.4)
MCV RBC AUTO: 88.2 FL (ref 80–97)
MONOCYTES NFR BLD: 9.7 % (ref 1–15)
NEUTROPHILS # BLD AUTO: 6.56 10*3/UL (ref 1.8–7.7)
NEUTROPHILS NFR BLD: 68.2 % (ref 37–80)
NITRITE UR QL STRIP: NEGATIVE
NUCLEATED RED BLOOD CELLS: 0 %
PH UR STRIP: 6 PH (ref 5–9)
PLATELETS: 279 10*3/UL (ref 142–424)
POTASSIUM SERPL-SCNC: 3.7 MMOL/L (ref 3.6–5.2)
PROT SERPL-MCNC: 6.4 G/DL (ref 6.4–8.2)
PROT UR QL STRIP: 29 MG/DL (ref 0–15)
PROT UR QL STRIP: ABNORMAL MG/DL
RBC # BLD AUTO: 4.14 10*6/UL (ref 4.2–5.4)
SODIUM BLD-SCNC: 137 MMOL/L (ref 135–145)
SP GR UR STRIP: 1.02 (ref 1–1.03)
SPECIMEN COLLECTION METHOD, URINE: ABNORMAL
URATE SERPL-MCNC: 3.8 MG/DL (ref 2.6–6)
UROBILINOGEN UR STRIP-ACNC: NORMAL MG/DL
WBC # BLD: 9.6 10*3/UL (ref 4.6–10.2)

## 2021-08-03 PROCEDURE — 0502F PR SUBSEQUENT PRENATAL CARE: ICD-10-PCS | Mod: CPTII,S$GLB,, | Performed by: OBSTETRICS & GYNECOLOGY

## 2021-08-03 PROCEDURE — 0502F SUBSEQUENT PRENATAL CARE: CPT | Mod: CPTII,S$GLB,, | Performed by: OBSTETRICS & GYNECOLOGY

## 2021-08-06 ENCOUNTER — ROUTINE PRENATAL (OUTPATIENT)
Dept: OBSTETRICS AND GYNECOLOGY | Facility: CLINIC | Age: 36
End: 2021-08-06
Payer: COMMERCIAL

## 2021-08-06 VITALS
HEART RATE: 86 BPM | WEIGHT: 226 LBS | SYSTOLIC BLOOD PRESSURE: 142 MMHG | DIASTOLIC BLOOD PRESSURE: 88 MMHG | BODY MASS INDEX: 35.4 KG/M2

## 2021-08-06 DIAGNOSIS — Z34.03 ENCOUNTER FOR SUPERVISION OF NORMAL FIRST PREGNANCY IN THIRD TRIMESTER: Primary | ICD-10-CM

## 2021-08-06 DIAGNOSIS — O16.9 ELEVATED BLOOD PRESSURE AFFECTING PREGNANCY, ANTEPARTUM: ICD-10-CM

## 2021-08-06 DIAGNOSIS — O09.529 ADVANCED MATERNAL AGE IN MULTIGRAVIDA, UNSPECIFIED TRIMESTER: ICD-10-CM

## 2021-08-06 DIAGNOSIS — Z86.39 HISTORY OF DIABETES MELLITUS: ICD-10-CM

## 2021-08-06 PROCEDURE — 0502F PR SUBSEQUENT PRENATAL CARE: ICD-10-PCS | Mod: CPTII,S$GLB,, | Performed by: OBSTETRICS & GYNECOLOGY

## 2021-08-06 PROCEDURE — 0502F SUBSEQUENT PRENATAL CARE: CPT | Mod: CPTII,S$GLB,, | Performed by: OBSTETRICS & GYNECOLOGY

## 2021-08-09 ENCOUNTER — OUTSIDE PLACE OF SERVICE (OUTPATIENT)
Dept: OBSTETRICS AND GYNECOLOGY | Facility: CLINIC | Age: 36
End: 2021-08-09
Payer: COMMERCIAL

## 2021-08-09 ENCOUNTER — OUTSIDE PLACE OF SERVICE (OUTPATIENT)
Dept: OBSTETRICS AND GYNECOLOGY | Facility: CLINIC | Age: 36
End: 2021-08-09

## 2021-08-09 LAB
APPEARANCE, UA: ABNORMAL
APTT PPP: 25.6 SEC (ref 23.6–36.4)
BILIRUB UR QL STRIP: NEGATIVE MG/DL
CALCIUM BLD-MCNC: POSITIVE MG/DL
COLOR UR: ABNORMAL
COVID-19 AB, IGM: NEGATIVE
ERYTHROCYTE [DISTWIDTH] IN BLOOD BY AUTOMATED COUNT: 14.7 % (ref 12.5–18)
GLUCOSE (UA): NORMAL MG/DL
HCT VFR BLD AUTO: 39.4 % (ref 37–47)
HGB BLD-MCNC: 13.1 G/DL (ref 12–16)
HGB UR QL STRIP: NEGATIVE /UL
INR PPP: 0.9 INR (ref 0.9–1.1)
KETONES UR QL STRIP: 150 MG/DL
LEUKOCYTE ESTERASE UR QL STRIP: NEGATIVE /UL
MCH RBC QN AUTO: 29.8 PG (ref 27–31.2)
MCHC RBC AUTO-ENTMCNC: 33.2 G/DL (ref 31.8–35.4)
MCV RBC AUTO: 89.5 FL (ref 80–97)
NITRITE UR QL STRIP: NEGATIVE
NUCLEATED RED BLOOD CELLS: 0 %
PH UR STRIP: 6 PH (ref 5–9)
PLATELETS: 281 10*3/UL (ref 142–424)
PROT UR QL STRIP: ABNORMAL MG/DL
PROTHROMBIN TIME: 10.5 SEC (ref 10.4–12.8)
RBC # BLD AUTO: 4.4 10*6/UL (ref 4.2–5.4)
RPR: NON REACTIVE
SP GR UR STRIP: 1.02 (ref 1–1.03)
SPECIMEN COLLECTION METHOD, URINE: ABNORMAL
UROBILINOGEN UR STRIP-ACNC: NORMAL MG/DL
WBC # BLD: 8.4 10*3/UL (ref 4.6–10.2)

## 2021-08-09 PROCEDURE — 59510 CESAREAN DELIVERY: CPT | Mod: ,,, | Performed by: OBSTETRICS & GYNECOLOGY

## 2021-08-09 PROCEDURE — 59510 PR FULL ROUT OBSTE CARE,CESAREAN DELIV: ICD-10-PCS | Mod: ,,, | Performed by: OBSTETRICS & GYNECOLOGY

## 2021-08-09 PROCEDURE — 59514 CESAREAN DELIVERY ONLY: CPT | Mod: 80,,, | Performed by: STUDENT IN AN ORGANIZED HEALTH CARE EDUCATION/TRAINING PROGRAM

## 2021-08-09 PROCEDURE — 59514 PR CESAREAN DELIVERY ONLY: ICD-10-PCS | Mod: 80,,, | Performed by: STUDENT IN AN ORGANIZED HEALTH CARE EDUCATION/TRAINING PROGRAM

## 2021-08-10 LAB
ERYTHROCYTE [DISTWIDTH] IN BLOOD BY AUTOMATED COUNT: 14.7 % (ref 12.5–18)
HCT VFR BLD AUTO: 33.5 % (ref 37–47)
HGB BLD-MCNC: 11 G/DL (ref 12–16)
MCH RBC QN AUTO: 29.8 PG (ref 27–31.2)
MCHC RBC AUTO-ENTMCNC: 32.8 G/DL (ref 31.8–35.4)
MCV RBC AUTO: 90.8 FL (ref 80–97)
NUCLEATED RED BLOOD CELLS: 0 %
PLATELETS: 215 10*3/UL (ref 142–424)
RBC # BLD AUTO: 3.69 10*6/UL (ref 4.2–5.4)
WBC # BLD: 9 10*3/UL (ref 4.6–10.2)

## 2021-08-12 ENCOUNTER — PATIENT MESSAGE (OUTPATIENT)
Dept: OBSTETRICS AND GYNECOLOGY | Facility: CLINIC | Age: 36
End: 2021-08-12

## 2021-08-18 ENCOUNTER — PATIENT MESSAGE (OUTPATIENT)
Dept: OBSTETRICS AND GYNECOLOGY | Facility: CLINIC | Age: 36
End: 2021-08-18

## 2021-08-19 ENCOUNTER — TELEPHONE (OUTPATIENT)
Dept: OBSTETRICS AND GYNECOLOGY | Facility: CLINIC | Age: 36
End: 2021-08-19

## 2021-08-20 ENCOUNTER — POSTPARTUM VISIT (OUTPATIENT)
Dept: OBSTETRICS AND GYNECOLOGY | Facility: CLINIC | Age: 36
End: 2021-08-20
Payer: COMMERCIAL

## 2021-08-20 VITALS
BODY MASS INDEX: 31.95 KG/M2 | WEIGHT: 204 LBS | HEART RATE: 89 BPM | DIASTOLIC BLOOD PRESSURE: 97 MMHG | SYSTOLIC BLOOD PRESSURE: 154 MMHG

## 2021-08-20 PROCEDURE — 99499 NO LOS: ICD-10-PCS | Mod: S$GLB,,, | Performed by: OBSTETRICS & GYNECOLOGY

## 2021-08-20 PROCEDURE — 99499 UNLISTED E&M SERVICE: CPT | Mod: S$GLB,,, | Performed by: OBSTETRICS & GYNECOLOGY

## 2021-08-27 ENCOUNTER — PATIENT MESSAGE (OUTPATIENT)
Dept: OBSTETRICS AND GYNECOLOGY | Facility: CLINIC | Age: 36
End: 2021-08-27

## 2021-09-08 ENCOUNTER — PATIENT MESSAGE (OUTPATIENT)
Dept: OBSTETRICS AND GYNECOLOGY | Facility: CLINIC | Age: 36
End: 2021-09-08

## 2021-09-30 ENCOUNTER — TELEPHONE (OUTPATIENT)
Dept: OBSTETRICS AND GYNECOLOGY | Facility: CLINIC | Age: 36
End: 2021-09-30

## 2021-10-01 ENCOUNTER — POSTPARTUM VISIT (OUTPATIENT)
Dept: OBSTETRICS AND GYNECOLOGY | Facility: CLINIC | Age: 36
End: 2021-10-01
Payer: COMMERCIAL

## 2021-10-01 VITALS
HEART RATE: 71 BPM | SYSTOLIC BLOOD PRESSURE: 141 MMHG | HEIGHT: 67 IN | WEIGHT: 206.38 LBS | BODY MASS INDEX: 32.39 KG/M2 | DIASTOLIC BLOOD PRESSURE: 91 MMHG

## 2021-10-01 PROCEDURE — 0503F PR POSTPARTUM CARE VISIT: ICD-10-PCS | Mod: CPTII,S$GLB,, | Performed by: OBSTETRICS & GYNECOLOGY

## 2021-10-01 PROCEDURE — 0503F POSTPARTUM CARE VISIT: CPT | Mod: CPTII,S$GLB,, | Performed by: OBSTETRICS & GYNECOLOGY

## 2021-10-18 ENCOUNTER — PATIENT MESSAGE (OUTPATIENT)
Dept: OBSTETRICS AND GYNECOLOGY | Facility: CLINIC | Age: 36
End: 2021-10-18
Payer: COMMERCIAL

## 2021-11-12 ENCOUNTER — PATIENT MESSAGE (OUTPATIENT)
Dept: OBSTETRICS AND GYNECOLOGY | Facility: CLINIC | Age: 36
End: 2021-11-12
Payer: COMMERCIAL

## 2021-11-12 DIAGNOSIS — Z86.39 HISTORY OF DIABETES MELLITUS: Primary | ICD-10-CM

## 2021-11-12 RX ORDER — METFORMIN HYDROCHLORIDE 500 MG/1
500 TABLET, EXTENDED RELEASE ORAL
Qty: 90 TABLET | Refills: 0 | Status: SHIPPED | OUTPATIENT
Start: 2021-11-12 | End: 2021-12-12

## 2022-10-04 ENCOUNTER — OFFICE VISIT (OUTPATIENT)
Dept: OBSTETRICS AND GYNECOLOGY | Facility: CLINIC | Age: 37
End: 2022-10-04
Payer: COMMERCIAL

## 2022-10-04 VITALS
HEART RATE: 89 BPM | SYSTOLIC BLOOD PRESSURE: 141 MMHG | BODY MASS INDEX: 36.65 KG/M2 | WEIGHT: 234 LBS | DIASTOLIC BLOOD PRESSURE: 100 MMHG

## 2022-10-04 DIAGNOSIS — Z01.419 ENCOUNTER FOR GYNECOLOGICAL EXAMINATION WITHOUT ABNORMAL FINDING: Primary | ICD-10-CM

## 2022-10-04 PROCEDURE — 3008F PR BODY MASS INDEX (BMI) DOCUMENTED: ICD-10-PCS | Mod: CPTII,S$GLB,, | Performed by: OBSTETRICS & GYNECOLOGY

## 2022-10-04 PROCEDURE — 99395 PREV VISIT EST AGE 18-39: CPT | Mod: S$GLB,,, | Performed by: OBSTETRICS & GYNECOLOGY

## 2022-10-04 PROCEDURE — 1159F PR MEDICATION LIST DOCUMENTED IN MEDICAL RECORD: ICD-10-PCS | Mod: CPTII,S$GLB,, | Performed by: OBSTETRICS & GYNECOLOGY

## 2022-10-04 PROCEDURE — 3080F DIAST BP >= 90 MM HG: CPT | Mod: CPTII,S$GLB,, | Performed by: OBSTETRICS & GYNECOLOGY

## 2022-10-04 PROCEDURE — 3077F SYST BP >= 140 MM HG: CPT | Mod: CPTII,S$GLB,, | Performed by: OBSTETRICS & GYNECOLOGY

## 2022-10-04 PROCEDURE — 3077F PR MOST RECENT SYSTOLIC BLOOD PRESSURE >= 140 MM HG: ICD-10-PCS | Mod: CPTII,S$GLB,, | Performed by: OBSTETRICS & GYNECOLOGY

## 2022-10-04 PROCEDURE — 1159F MED LIST DOCD IN RCRD: CPT | Mod: CPTII,S$GLB,, | Performed by: OBSTETRICS & GYNECOLOGY

## 2022-10-04 PROCEDURE — 3008F BODY MASS INDEX DOCD: CPT | Mod: CPTII,S$GLB,, | Performed by: OBSTETRICS & GYNECOLOGY

## 2022-10-04 PROCEDURE — 99395 PR PREVENTIVE VISIT,EST,18-39: ICD-10-PCS | Mod: S$GLB,,, | Performed by: OBSTETRICS & GYNECOLOGY

## 2022-10-04 PROCEDURE — 3080F PR MOST RECENT DIASTOLIC BLOOD PRESSURE >= 90 MM HG: ICD-10-PCS | Mod: CPTII,S$GLB,, | Performed by: OBSTETRICS & GYNECOLOGY

## 2022-10-04 RX ORDER — HYDROCHLOROTHIAZIDE 12.5 MG/1
TABLET ORAL
COMMUNITY
Start: 2022-08-02

## 2022-10-04 NOTE — PROGRESS NOTES
Subjective:       Patient ID: Jenae Fierro is a 36 y.o. female.    Chief Complaint:  Well Woman      History of Present Illness  Pt here for gyn annual.  History and past labs reviewed with patient.    Complaints weight gain      Review of Systems  Review of Systems   Constitutional:  Negative for chills and fever.   Respiratory:  Negative for shortness of breath.    Cardiovascular:  Negative for chest pain.   Gastrointestinal:  Negative for abdominal pain, blood in stool, constipation, diarrhea, nausea, vomiting and reflux.   Genitourinary:  Negative for dysmenorrhea, dyspareunia, dysuria, hematuria, hot flashes, menorrhagia, menstrual problem, pelvic pain, vaginal bleeding, vaginal discharge, postcoital bleeding and vaginal dryness.   Musculoskeletal:  Negative for arthralgias and joint swelling.   Integumentary:  Negative for rash, hair changes, breast mass, nipple discharge and breast skin changes.   Psychiatric/Behavioral:  Negative for depression. The patient is not nervous/anxious.    Breast: Negative for asymmetry, lump, mass, nipple discharge and skin changes        Objective:     Vitals:    10/04/22 1108   BP: (!) 141/100   Pulse: 89   Weight: 106.1 kg (234 lb)       Physical Exam:   Constitutional: She appears well-developed and well-nourished. No distress.    HENT:   Head: Normocephalic and atraumatic.    Eyes: Conjunctivae and EOM are normal.    Neck: No tracheal deviation present. No thyromegaly present.    Cardiovascular:       Exam reveals no clubbing, no cyanosis and no edema.        Pulmonary/Chest: Effort normal. No respiratory distress.        Abdominal: Soft. She exhibits no distension and no mass. There is no abdominal tenderness. There is no rebound and no guarding. No hernia.     Genitourinary:    Vagina, uterus and rectum normal.      Pelvic exam was performed with patient supine.   There is no rash, tenderness, lesion or injury on the right labia. There is no rash, tenderness,  lesion or injury on the left labia. Cervix is normal. Right adnexum displays no mass, no tenderness and no fullness. Left adnexum displays no mass, no tenderness and no fullness.                Skin: She is not diaphoretic. No cyanosis. Nails show no clubbing.       breast exam wnl- no nipple dc, skin changes, masses or lymph nodes palpated bilaterally    Assessment:        1. Encounter for gynecological examination without abnormal finding                Plan:      Pap utd  Mmg NA  Weight watchers

## 2022-10-06 ENCOUNTER — PATIENT MESSAGE (OUTPATIENT)
Dept: OBSTETRICS AND GYNECOLOGY | Facility: CLINIC | Age: 37
End: 2022-10-06
Payer: COMMERCIAL

## 2022-10-06 DIAGNOSIS — N89.8 VAGINAL IRRITATION: Primary | ICD-10-CM

## 2022-10-06 RX ORDER — CLOTRIMAZOLE AND BETAMETHASONE DIPROPIONATE 10; .64 MG/G; MG/G
CREAM TOPICAL
Qty: 15 G | Refills: 1 | Status: SHIPPED | OUTPATIENT
Start: 2022-10-06 | End: 2023-10-06

## 2022-12-22 ENCOUNTER — OFFICE VISIT (OUTPATIENT)
Dept: OBSTETRICS AND GYNECOLOGY | Facility: CLINIC | Age: 37
End: 2022-12-22
Payer: COMMERCIAL

## 2022-12-22 VITALS
HEART RATE: 96 BPM | BODY MASS INDEX: 37.45 KG/M2 | HEIGHT: 67 IN | SYSTOLIC BLOOD PRESSURE: 133 MMHG | DIASTOLIC BLOOD PRESSURE: 90 MMHG | WEIGHT: 238.63 LBS

## 2022-12-22 DIAGNOSIS — N64.4 BREAST PAIN, LEFT: Primary | ICD-10-CM

## 2022-12-22 PROCEDURE — 3075F SYST BP GE 130 - 139MM HG: CPT | Mod: CPTII,S$GLB,, | Performed by: OBSTETRICS & GYNECOLOGY

## 2022-12-22 PROCEDURE — 99213 PR OFFICE/OUTPT VISIT, EST, LEVL III, 20-29 MIN: ICD-10-PCS | Mod: S$GLB,,, | Performed by: OBSTETRICS & GYNECOLOGY

## 2022-12-22 PROCEDURE — 3080F DIAST BP >= 90 MM HG: CPT | Mod: CPTII,S$GLB,, | Performed by: OBSTETRICS & GYNECOLOGY

## 2022-12-22 PROCEDURE — 1159F MED LIST DOCD IN RCRD: CPT | Mod: CPTII,S$GLB,, | Performed by: OBSTETRICS & GYNECOLOGY

## 2022-12-22 PROCEDURE — 3075F PR MOST RECENT SYSTOLIC BLOOD PRESS GE 130-139MM HG: ICD-10-PCS | Mod: CPTII,S$GLB,, | Performed by: OBSTETRICS & GYNECOLOGY

## 2022-12-22 PROCEDURE — 3008F PR BODY MASS INDEX (BMI) DOCUMENTED: ICD-10-PCS | Mod: CPTII,S$GLB,, | Performed by: OBSTETRICS & GYNECOLOGY

## 2022-12-22 PROCEDURE — 3080F PR MOST RECENT DIASTOLIC BLOOD PRESSURE >= 90 MM HG: ICD-10-PCS | Mod: CPTII,S$GLB,, | Performed by: OBSTETRICS & GYNECOLOGY

## 2022-12-22 PROCEDURE — 3008F BODY MASS INDEX DOCD: CPT | Mod: CPTII,S$GLB,, | Performed by: OBSTETRICS & GYNECOLOGY

## 2022-12-22 PROCEDURE — 99213 OFFICE O/P EST LOW 20 MIN: CPT | Mod: S$GLB,,, | Performed by: OBSTETRICS & GYNECOLOGY

## 2022-12-22 PROCEDURE — 1159F PR MEDICATION LIST DOCUMENTED IN MEDICAL RECORD: ICD-10-PCS | Mod: CPTII,S$GLB,, | Performed by: OBSTETRICS & GYNECOLOGY

## 2022-12-22 NOTE — PROGRESS NOTES
Subjective:       Patient ID: Jenae Fierro is a 36 y.o. female.    Chief Complaint:  Breast Pain      History of Present Illness  HPI  Breast pain left side.      GYN & OB History  No LMP recorded.   Date of Last Pap: No result found    OB History    Para Term  AB Living   1 1 1 0 0 1   SAB IAB Ectopic Multiple Live Births   0 0 0 0 1      # Outcome Date GA Lbr Tom/2nd Weight Sex Delivery Anes PTL Lv   1 Term 21 37w1d  3.37 kg (7 lb 6.9 oz) F CS-LTranv   JELENA      Complications: Advanced maternal age in multigravida, Gestational diabetes mellitus (GDM), Gestational hypertension       Review of Systems  Review of Systems   Constitutional:  Negative for chills and fever.   Respiratory:  Negative for shortness of breath.    Cardiovascular:  Negative for chest pain.   Gastrointestinal:  Negative for abdominal pain, blood in stool, constipation, diarrhea, nausea, vomiting and reflux.   Genitourinary:  Negative for dysmenorrhea, dyspareunia, dysuria, hematuria, hot flashes, menorrhagia, menstrual problem, pelvic pain, vaginal bleeding, vaginal discharge, postcoital bleeding and vaginal dryness.   Musculoskeletal:  Negative for arthralgias and joint swelling.   Integumentary:  Negative for rash and hair changes.   Psychiatric/Behavioral:  Negative for depression. The patient is not nervous/anxious.    Breast: Positive for mastodynia.        Objective:    Physical Exam      Breast exam nml     Assessment:        1. Breast pain, left               Plan:      Breast US

## 2023-08-30 ENCOUNTER — PATIENT MESSAGE (OUTPATIENT)
Dept: OBSTETRICS AND GYNECOLOGY | Facility: CLINIC | Age: 38
End: 2023-08-30
Payer: COMMERCIAL

## 2023-10-12 ENCOUNTER — OFFICE VISIT (OUTPATIENT)
Dept: OBSTETRICS AND GYNECOLOGY | Facility: CLINIC | Age: 38
End: 2023-10-12
Payer: COMMERCIAL

## 2023-10-12 VITALS
SYSTOLIC BLOOD PRESSURE: 129 MMHG | WEIGHT: 214 LBS | HEART RATE: 71 BPM | DIASTOLIC BLOOD PRESSURE: 87 MMHG | BODY MASS INDEX: 33.52 KG/M2

## 2023-10-12 DIAGNOSIS — Z01.419 ENCOUNTER FOR GYNECOLOGICAL EXAMINATION WITHOUT ABNORMAL FINDING: Primary | ICD-10-CM

## 2023-10-12 PROCEDURE — 99395 PR PREVENTIVE VISIT,EST,18-39: ICD-10-PCS | Mod: S$GLB,,, | Performed by: OBSTETRICS & GYNECOLOGY

## 2023-10-12 PROCEDURE — 1159F PR MEDICATION LIST DOCUMENTED IN MEDICAL RECORD: ICD-10-PCS | Mod: CPTII,S$GLB,, | Performed by: OBSTETRICS & GYNECOLOGY

## 2023-10-12 PROCEDURE — 3074F SYST BP LT 130 MM HG: CPT | Mod: CPTII,S$GLB,, | Performed by: OBSTETRICS & GYNECOLOGY

## 2023-10-12 PROCEDURE — 1159F MED LIST DOCD IN RCRD: CPT | Mod: CPTII,S$GLB,, | Performed by: OBSTETRICS & GYNECOLOGY

## 2023-10-12 PROCEDURE — 99395 PREV VISIT EST AGE 18-39: CPT | Mod: S$GLB,,, | Performed by: OBSTETRICS & GYNECOLOGY

## 2023-10-12 PROCEDURE — 3074F PR MOST RECENT SYSTOLIC BLOOD PRESSURE < 130 MM HG: ICD-10-PCS | Mod: CPTII,S$GLB,, | Performed by: OBSTETRICS & GYNECOLOGY

## 2023-10-12 PROCEDURE — 3079F DIAST BP 80-89 MM HG: CPT | Mod: CPTII,S$GLB,, | Performed by: OBSTETRICS & GYNECOLOGY

## 2023-10-12 PROCEDURE — 3079F PR MOST RECENT DIASTOLIC BLOOD PRESSURE 80-89 MM HG: ICD-10-PCS | Mod: CPTII,S$GLB,, | Performed by: OBSTETRICS & GYNECOLOGY

## 2023-10-12 PROCEDURE — 3008F BODY MASS INDEX DOCD: CPT | Mod: CPTII,S$GLB,, | Performed by: OBSTETRICS & GYNECOLOGY

## 2023-10-12 PROCEDURE — 3008F PR BODY MASS INDEX (BMI) DOCUMENTED: ICD-10-PCS | Mod: CPTII,S$GLB,, | Performed by: OBSTETRICS & GYNECOLOGY

## 2023-10-12 RX ORDER — MONTELUKAST SODIUM 10 MG/1
TABLET ORAL
COMMUNITY
Start: 2023-09-17

## 2023-10-12 NOTE — PROGRESS NOTES
Subjective:       Patient ID: Jenae Fierro is a 37 y.o. female.    Chief Complaint:  Annual Exam and Well Woman      History of Present Illness  Pt here for gyn annual.  History and past labs reviewed with patient.    Complaints none      Review of Systems  Review of Systems   Constitutional:  Negative for chills and fever.   Respiratory:  Negative for shortness of breath.    Cardiovascular:  Negative for chest pain.   Gastrointestinal:  Negative for abdominal pain, blood in stool, constipation, diarrhea, nausea, vomiting and reflux.   Genitourinary:  Negative for dysmenorrhea, dyspareunia, dysuria, hematuria, hot flashes, menorrhagia, menstrual problem, pelvic pain, vaginal bleeding, vaginal discharge, postcoital bleeding and vaginal dryness.   Musculoskeletal:  Negative for arthralgias and joint swelling.   Integumentary:  Negative for rash, hair changes, breast mass, nipple discharge and breast skin changes.   Psychiatric/Behavioral:  Negative for depression. The patient is not nervous/anxious.    Breast: Negative for asymmetry, lump, mass, nipple discharge and skin changes          Objective:     Vitals:    10/12/23 1041   BP: 129/87   Pulse: 71   Weight: 97.1 kg (214 lb)       Physical Exam:   Constitutional: She appears well-developed and well-nourished. No distress.    HENT:   Head: Normocephalic and atraumatic.    Eyes: Conjunctivae and EOM are normal.    Neck: No tracheal deviation present. No thyromegaly present.    Cardiovascular:       Exam reveals no clubbing, no cyanosis and no edema.        Pulmonary/Chest: Effort normal. No respiratory distress.        Abdominal: Soft. She exhibits no distension and no mass. There is no abdominal tenderness. There is no rebound and no guarding. No hernia.     Genitourinary:    Vagina, uterus and rectum normal.      Pelvic exam was performed with patient supine.   There is no rash, tenderness, lesion or injury on the right labia. There is no rash,  tenderness, lesion or injury on the left labia. Cervix is normal. Right adnexum displays no mass, no tenderness and no fullness. Left adnexum displays no mass, no tenderness and no fullness.                Skin: She is not diaphoretic. No cyanosis. Nails show no clubbing.         breast exam wnl- no nipple dc, skin changes, masses or lymph nodes palpated bilaterally    Assessment:        1. Encounter for gynecological examination without abnormal finding               Plan:   Pap utd  Trying to get preg- stopped ozempic

## 2023-10-24 ENCOUNTER — PATIENT MESSAGE (OUTPATIENT)
Dept: OBSTETRICS AND GYNECOLOGY | Facility: CLINIC | Age: 38
End: 2023-10-24
Payer: COMMERCIAL

## 2023-11-14 DIAGNOSIS — Z34.91 ENCOUNTER FOR PREGNANCY RELATED EXAMINATION IN FIRST TRIMESTER: Primary | ICD-10-CM

## 2023-11-14 DIAGNOSIS — Z03.89 ENCOUNTER FOR OBSERVATION FOR OTHER SUSPECTED DISEASES AND CONDITIONS RULED OUT: ICD-10-CM

## 2023-11-15 ENCOUNTER — PROCEDURE VISIT (OUTPATIENT)
Dept: OBSTETRICS AND GYNECOLOGY | Facility: CLINIC | Age: 38
End: 2023-11-15
Payer: COMMERCIAL

## 2023-11-15 DIAGNOSIS — Z34.91 ENCOUNTER FOR PREGNANCY RELATED EXAMINATION IN FIRST TRIMESTER: ICD-10-CM

## 2023-11-15 PROCEDURE — 76801 US OB/GYN PROCEDURE (VIEWPOINT): ICD-10-PCS | Mod: S$GLB,,, | Performed by: OBSTETRICS & GYNECOLOGY

## 2023-11-15 PROCEDURE — 76801 OB US < 14 WKS SINGLE FETUS: CPT | Mod: S$GLB,,, | Performed by: OBSTETRICS & GYNECOLOGY

## 2023-11-17 LAB
ABS NRBC COUNT: 0 X 10 3/UL (ref 0–0.01)
ABSOLUTE BASOPHIL: 0.08 X 10 3/UL (ref 0–0.22)
ABSOLUTE EOSINOPHIL: 0.25 X 10 3/UL (ref 0.04–0.54)
ABSOLUTE IMMATURE GRAN: 0.04 X 10 3/UL (ref 0–0.04)
ABSOLUTE LYMPHOCYTE: 3.08 X 10 3/UL (ref 0.86–4.75)
ABSOLUTE MONOCYTE: 0.81 X 10 3/UL (ref 0.22–1.08)
AMPHETAMINES (500): NEGATIVE NG/ML
ANTIBODY SCREEN: NEGATIVE
BARBITURATES (200): NEGATIVE NG/ML
BASOPHILS NFR BLD: 0.8 % (ref 0.2–1.2)
BENZODIAZEPINES: NEGATIVE NG/ML
BLOOD GROUPING: NORMAL
BLOOD TYPE (D): POSITIVE
COCAINE (150): NEGATIVE NG/ML
EOSINOPHIL NFR BLD: 2.4 % (ref 0.7–7)
HBV SURFACE AG SERPL QL IA: NONREACTIVE
HCT VFR BLD AUTO: 44.6 % (ref 37–47)
HCV IGG SERPL QL IA: NONREACTIVE
HGB BLD-MCNC: 14.6 G/DL (ref 12–16)
HIV 1+2 AB+HIV1 P24 AG SERPL QL IA: NONREACTIVE
IMMATURE GRANULOCYTES: 0.4 % (ref 0–0.5)
LYMPHOCYTES NFR BLD: 29.2 % (ref 19.3–53.1)
MARIJUANA, THC (50): NEGATIVE NG/ML
MCH RBC QN AUTO: 29.1 PG (ref 27–32)
MCHC RBC AUTO-ENTMCNC: 32.7 G/DL (ref 32–36)
MCV RBC AUTO: 88.8 FL (ref 82–100)
METHADONE: NEGATIVE NG/ML
MONOCYTES NFR BLD: 7.7 % (ref 4.7–12.5)
NEUTROPHILS # BLD AUTO: 6.28 X 10 3/UL (ref 2.15–7.56)
NEUTROPHILS NFR BLD: 59.5 % (ref 34–71.1)
NUCLEATED RED BLOOD CELLS: 0 /100 WBC (ref 0–0.2)
OPIATES: NEGATIVE NG/ML
OXYCODONE: NEGATIVE NG/ML
PH: 6.8 (ref 4.5–8)
PHENCYCLIDINE (25): NEGATIVE NG/ML
PLATELET # BLD AUTO: 347 X 10 3/UL (ref 135–400)
RBC # BLD AUTO: 5.02 X 10 6/UL (ref 4.2–5.4)
RDW-SD: 40.5 FL (ref 37–54)
RUBELLA IGG SCREEN: NORMAL
SICKLE CELL PREP: NEGATIVE
SYPHILIS TREPONEMAL ANTIBODY: NONREACTIVE
URINE CREATININE D/S: 50.7 MG/DL
URINE CULTURE, ROUTINE: NORMAL
WBC # BLD: 10.54 X 10 3/UL (ref 4.3–10.8)

## 2023-11-27 ENCOUNTER — PROCEDURE VISIT (OUTPATIENT)
Dept: OBSTETRICS AND GYNECOLOGY | Facility: CLINIC | Age: 38
End: 2023-11-27
Payer: COMMERCIAL

## 2023-11-27 ENCOUNTER — ROUTINE PRENATAL (OUTPATIENT)
Dept: OBSTETRICS AND GYNECOLOGY | Facility: CLINIC | Age: 38
End: 2023-11-27
Payer: COMMERCIAL

## 2023-11-27 VITALS
WEIGHT: 218 LBS | HEART RATE: 84 BPM | BODY MASS INDEX: 34.14 KG/M2 | SYSTOLIC BLOOD PRESSURE: 153 MMHG | DIASTOLIC BLOOD PRESSURE: 95 MMHG

## 2023-11-27 DIAGNOSIS — O20.9 BLEEDING IN EARLY PREGNANCY: ICD-10-CM

## 2023-11-27 DIAGNOSIS — O20.9 BLEEDING IN EARLY PREGNANCY: Primary | ICD-10-CM

## 2023-11-27 DIAGNOSIS — O02.1 MISSED AB: Primary | ICD-10-CM

## 2023-11-27 LAB
ANION GAP SERPL CALC-SCNC: 10 MMOL/L (ref 3–11)
APPEARANCE, UA: ABNORMAL
BACTERIA SPEC CULT: ABNORMAL /HPF
BASOPHILS NFR BLD: 0.9 % (ref 0–3)
BILIRUB UR QL STRIP: NEGATIVE MG/DL
BUN SERPL-MCNC: 11 MG/DL (ref 7–18)
BUN/CREAT SERPL: 16.41 RATIO (ref 7–18)
CALCIUM SERPL-MCNC: 9.6 MG/DL (ref 8.8–10.5)
CHLORIDE SERPL-SCNC: 102 MMOL/L (ref 100–108)
CO2 SERPL-SCNC: 27 MMOL/L (ref 21–32)
COLOR UR: ABNORMAL
CREAT SERPL-MCNC: 0.67 MG/DL (ref 0.55–1.02)
EOSINOPHIL NFR BLD: 2 % (ref 1–3)
ERYTHROCYTE [DISTWIDTH] IN BLOOD BY AUTOMATED COUNT: 12.2 % (ref 12.5–18)
GFR ESTIMATION: > 60
GLUCOSE (UA): NORMAL MG/DL
GLUCOSE SERPL-MCNC: 130 MG/DL (ref 70–110)
HCT VFR BLD AUTO: 41.9 % (ref 37–47)
HGB BLD-MCNC: 13.9 G/DL (ref 12–16)
HGB UR QL STRIP: 250 /UL
KETONES UR QL STRIP: ABNORMAL MG/DL
LEUKOCYTE ESTERASE UR QL STRIP: 25 /UL
LYMPHOCYTES NFR BLD: 26.2 % (ref 25–40)
MCH RBC QN AUTO: 28.4 PG (ref 27–31.2)
MCHC RBC AUTO-ENTMCNC: 33.2 G/DL (ref 31.8–35.4)
MCV RBC AUTO: 85.7 FL (ref 80–97)
MONOCYTES NFR BLD: 7 % (ref 1–15)
NEUTROPHILS # BLD AUTO: 6.83 10*3/UL (ref 1.8–7.7)
NEUTROPHILS NFR BLD: 63.4 % (ref 37–80)
NITRITE UR QL STRIP: NEGATIVE
NUCLEATED RED BLOOD CELLS: 0 %
PH UR STRIP: 7 PH (ref 5–9)
PLATELETS: 322 10*3/UL (ref 142–424)
POTASSIUM SERPL-SCNC: 4.9 MMOL/L (ref 3.6–5.2)
PROT UR QL STRIP: 30 MG/DL
RBC # BLD AUTO: 4.89 10*6/UL (ref 4.2–5.4)
RBC #/AREA URNS HPF: ABNORMAL /HPF (ref 0–2)
SERVICE COMMENT 03: ABNORMAL
SODIUM BLD-SCNC: 139 MMOL/L (ref 135–145)
SP GR UR STRIP: 1.01 (ref 1–1.03)
SPECIMEN COLLECTION METHOD, URINE: ABNORMAL
SQUAMOUS EPITHELIAL, UA: ABNORMAL /LPF
UROBILINOGEN UR STRIP-ACNC: NORMAL MG/DL
WBC # BLD: 10.8 10*3/UL (ref 4.6–10.2)
WBC #/AREA URNS HPF: ABNORMAL /HPF (ref 0–5)

## 2023-11-27 PROCEDURE — 76801 OB US < 14 WKS SINGLE FETUS: CPT | Mod: S$GLB,,, | Performed by: OBSTETRICS & GYNECOLOGY

## 2023-11-27 PROCEDURE — 99214 PR OFFICE/OUTPT VISIT, EST, LEVL IV, 30-39 MIN: ICD-10-PCS | Mod: S$GLB,,, | Performed by: OBSTETRICS & GYNECOLOGY

## 2023-11-27 PROCEDURE — 76801 US OB/GYN PROCEDURE (VIEWPOINT): ICD-10-PCS | Mod: S$GLB,,, | Performed by: OBSTETRICS & GYNECOLOGY

## 2023-11-27 PROCEDURE — 99214 OFFICE O/P EST MOD 30 MIN: CPT | Mod: S$GLB,,, | Performed by: OBSTETRICS & GYNECOLOGY

## 2023-11-27 NOTE — PROGRESS NOTES
Subjective:       Patient ID: Jenae Fierro is a 37 y.o.  at 10w0d   Chief Complaint:  Initial Prenatal Visit      History of Present Illness  here for new ob exam.  Has had bleeding for 2 days.   Labs and history were reviewed with the patient today  No complaints      Past Medical History:   Diagnosis Date    Diabetes     Hypertension        Past Surgical History:   Procedure Laterality Date    APPENDECTOMY       SECTION, LOW TRANSVERSE         OB:    OB History    Para Term  AB Living   2 1 1 0 1 1   SAB IAB Ectopic Multiple Live Births   0 0 0 0 1      # Outcome Date GA Lbr Tom/2nd Weight Sex Delivery Anes PTL Lv   2 AB 23 10w0d    SAB   FD   1 Term 21 37w1d  3.37 kg (7 lb 6.9 oz) F CS-LTranv   JELENA      Complications: Advanced maternal age in multigravida, Gestational diabetes mellitus (GDM), Gestational hypertension     Gyn: no STD, never had abn pap   Meds:   Current Outpatient Medications:     aspirin 81 MG Chew, Take 81 mg by mouth once daily., Disp: , Rfl:     hydroCHLOROthiazide (HYDRODIURIL) 12.5 MG Tab, , Disp: , Rfl:     metFORMIN (GLUCOPHAGE-XR) 500 MG ER 24hr tablet, Take 1 tablet (500 mg total) by mouth 3 (three) times daily with meals. Take 1000 mg at breakfast and 500 mg at lunch, Disp: 90 tablet, Rfl: 0    montelukast (SINGULAIR) 10 mg tablet, , Disp: , Rfl:     NIFEdipine (PROCARDIA-XL) 30 MG (OSM) 24 hr tablet, Take 1 tablet (30 mg total) by mouth once daily., Disp: 90 tablet, Rfl: 4    All: Review of patient's allergies indicates:  No Known Allergies    SH:   Social History     Tobacco Use    Smoking status: Never    Smokeless tobacco: Never   Substance Use Topics    Alcohol use: Not Currently      FH: family history includes Cancer in her father; Diabetes in her mother and sister; Hypertension in her brother and mother.      Review of Systems  nml 1st trimester sx- sob, dec excercixe tolerance, fatigue and nausea  Neg for vag bleed,  dc, vomiting, cp, lof, fever, chills, ns, visual changes, swelling, headaches, constipation/diarrhea, dysuria, freq/urgency of urination     Objective:     Vitals:    11/27/23 1429   BP: (!) 153/95   Pulse: 84   Weight: 98.9 kg (218 lb)       NAD  NCAT  pupils normal size  Skin nml no rashes or lesions  No resp distress, resp even and unlabored  nt nd, no rebound no guarding  nml ext fem gent, normal cvx uterus and adnexa, no dc or bleeding  nml appearing rectum  No cyanosis or clubbing, edema appropriate for pregn    Uterus size approp for gest age         Assessment:        1. Missed ab               Plan:      Missed ab  -     Ambulatory Referral to External Surgery           To OR on Wednesday

## 2023-11-28 ENCOUNTER — OFFICE VISIT (OUTPATIENT)
Dept: OBSTETRICS AND GYNECOLOGY | Facility: CLINIC | Age: 38
End: 2023-11-28
Payer: COMMERCIAL

## 2023-11-28 ENCOUNTER — PROCEDURE VISIT (OUTPATIENT)
Dept: OBSTETRICS AND GYNECOLOGY | Facility: CLINIC | Age: 38
End: 2023-11-28
Payer: COMMERCIAL

## 2023-11-28 VITALS
HEART RATE: 89 BPM | SYSTOLIC BLOOD PRESSURE: 150 MMHG | BODY MASS INDEX: 34.14 KG/M2 | DIASTOLIC BLOOD PRESSURE: 91 MMHG | WEIGHT: 218 LBS

## 2023-11-28 DIAGNOSIS — O02.1 MISSED AB: ICD-10-CM

## 2023-11-28 DIAGNOSIS — R10.9 ABDOMINAL PAIN, UNSPECIFIED ABDOMINAL LOCATION: ICD-10-CM

## 2023-11-28 DIAGNOSIS — O03.9 COMPLETE ABORTION: Primary | ICD-10-CM

## 2023-11-28 DIAGNOSIS — O20.9 BLEEDING IN EARLY PREGNANCY: Primary | ICD-10-CM

## 2023-11-28 DIAGNOSIS — O02.1 MISSED AB: Primary | ICD-10-CM

## 2023-11-28 PROCEDURE — 76817 US OB/GYN PROCEDURE (VIEWPOINT): ICD-10-PCS | Mod: S$GLB,,, | Performed by: OBSTETRICS & GYNECOLOGY

## 2023-11-28 PROCEDURE — 99213 PR OFFICE/OUTPT VISIT, EST, LEVL III, 20-29 MIN: ICD-10-PCS | Mod: 25,S$GLB,, | Performed by: OBSTETRICS & GYNECOLOGY

## 2023-11-28 PROCEDURE — 76817 TRANSVAGINAL US OBSTETRIC: CPT | Mod: S$GLB,,, | Performed by: OBSTETRICS & GYNECOLOGY

## 2023-11-28 PROCEDURE — 3008F BODY MASS INDEX DOCD: CPT | Mod: CPTII,S$GLB,, | Performed by: OBSTETRICS & GYNECOLOGY

## 2023-11-28 PROCEDURE — 3080F DIAST BP >= 90 MM HG: CPT | Mod: CPTII,S$GLB,, | Performed by: OBSTETRICS & GYNECOLOGY

## 2023-11-28 PROCEDURE — 96372 PR INJECTION,THERAP/PROPH/DIAG2ST, IM OR SUBCUT: ICD-10-PCS | Mod: S$GLB,,, | Performed by: OBSTETRICS & GYNECOLOGY

## 2023-11-28 PROCEDURE — 3080F PR MOST RECENT DIASTOLIC BLOOD PRESSURE >= 90 MM HG: ICD-10-PCS | Mod: CPTII,S$GLB,, | Performed by: OBSTETRICS & GYNECOLOGY

## 2023-11-28 PROCEDURE — 3008F PR BODY MASS INDEX (BMI) DOCUMENTED: ICD-10-PCS | Mod: CPTII,S$GLB,, | Performed by: OBSTETRICS & GYNECOLOGY

## 2023-11-28 PROCEDURE — 99213 OFFICE O/P EST LOW 20 MIN: CPT | Mod: 25,S$GLB,, | Performed by: OBSTETRICS & GYNECOLOGY

## 2023-11-28 PROCEDURE — 96372 THER/PROPH/DIAG INJ SC/IM: CPT | Mod: S$GLB,,, | Performed by: OBSTETRICS & GYNECOLOGY

## 2023-11-28 PROCEDURE — 3077F PR MOST RECENT SYSTOLIC BLOOD PRESSURE >= 140 MM HG: ICD-10-PCS | Mod: CPTII,S$GLB,, | Performed by: OBSTETRICS & GYNECOLOGY

## 2023-11-28 PROCEDURE — 3077F SYST BP >= 140 MM HG: CPT | Mod: CPTII,S$GLB,, | Performed by: OBSTETRICS & GYNECOLOGY

## 2023-11-28 RX ORDER — KETOROLAC TROMETHAMINE 30 MG/ML
60 INJECTION, SOLUTION INTRAMUSCULAR; INTRAVENOUS
Status: COMPLETED | OUTPATIENT
Start: 2023-11-28 | End: 2023-11-28

## 2023-11-28 RX ORDER — IBUPROFEN 800 MG/1
800 TABLET ORAL 3 TIMES DAILY
Qty: 30 TABLET | Refills: 0 | Status: SHIPPED | OUTPATIENT
Start: 2023-11-28

## 2023-11-28 RX ORDER — IBUPROFEN 800 MG/1
800 TABLET ORAL EVERY 8 HOURS PRN
Qty: 30 TABLET | Refills: 0 | Status: SHIPPED | OUTPATIENT
Start: 2023-11-28 | End: 2024-11-27

## 2023-11-28 RX ADMIN — KETOROLAC TROMETHAMINE 60 MG: 30 INJECTION, SOLUTION INTRAMUSCULAR; INTRAVENOUS at 03:11

## 2023-11-28 NOTE — PROGRESS NOTES
Subjective:      Patient ID: Jenae Fierro is a 37 y.o. female.    Chief Complaint:  Follow-up      History of Present Illness  HPI  US down- complete AB with thickened stripe  Bleeding now    GYN & OB History  No LMP recorded.   Date of Last Pap: No result found    OB History    Para Term  AB Living   2 1 1 0 1 1   SAB IAB Ectopic Multiple Live Births   0 0 0 0 1      # Outcome Date GA Lbr Tom/2nd Weight Sex Delivery Anes PTL Lv   2 AB 23 10w0d    SAB   FD   1 Term 21 37w1d  3.37 kg (7 lb 6.9 oz) F CS-LTranv   JELENA      Complications: Advanced maternal age in multigravida, Gestational diabetes mellitus (GDM), Gestational hypertension       Review of Systems  Review of Systems       Objective:     Physical Exam   Os open with POC- removed       Assessment:     1. Complete               Plan:     Rtc prn  Will cancel D/C

## 2024-10-14 ENCOUNTER — OFFICE VISIT (OUTPATIENT)
Dept: OBSTETRICS AND GYNECOLOGY | Facility: CLINIC | Age: 39
End: 2024-10-14
Payer: COMMERCIAL

## 2024-10-14 VITALS
SYSTOLIC BLOOD PRESSURE: 136 MMHG | HEART RATE: 87 BPM | BODY MASS INDEX: 36.34 KG/M2 | DIASTOLIC BLOOD PRESSURE: 84 MMHG | WEIGHT: 232 LBS

## 2024-10-14 DIAGNOSIS — Z01.419 ENCOUNTER FOR GYNECOLOGICAL EXAMINATION WITHOUT ABNORMAL FINDING: Primary | ICD-10-CM

## 2024-10-14 DIAGNOSIS — O02.1 MISSED AB: ICD-10-CM

## 2024-10-14 PROCEDURE — 3075F SYST BP GE 130 - 139MM HG: CPT | Mod: CPTII,,, | Performed by: OBSTETRICS & GYNECOLOGY

## 2024-10-14 PROCEDURE — 3079F DIAST BP 80-89 MM HG: CPT | Mod: CPTII,,, | Performed by: OBSTETRICS & GYNECOLOGY

## 2024-10-14 PROCEDURE — 99395 PREV VISIT EST AGE 18-39: CPT | Mod: S$PBB,,, | Performed by: OBSTETRICS & GYNECOLOGY

## 2024-10-14 PROCEDURE — 3008F BODY MASS INDEX DOCD: CPT | Mod: CPTII,,, | Performed by: OBSTETRICS & GYNECOLOGY

## 2024-10-14 PROCEDURE — 1159F MED LIST DOCD IN RCRD: CPT | Mod: CPTII,,, | Performed by: OBSTETRICS & GYNECOLOGY

## 2024-10-14 RX ORDER — IBUPROFEN 800 MG/1
800 TABLET ORAL EVERY 8 HOURS PRN
Qty: 30 TABLET | Refills: 6 | Status: SHIPPED | OUTPATIENT
Start: 2024-10-14 | End: 2025-10-14

## 2024-10-14 NOTE — PROGRESS NOTES
Subjective:       Patient ID: Jenae Fierro is a 38 y.o. female.    Chief Complaint:  Well Woman      History of Present Illness  Pt here for gyn annual.  History and past labs reviewed with patient.    Complaints none      Review of Systems  Review of Systems   Constitutional:  Negative for chills and fever.   Respiratory:  Negative for shortness of breath.    Cardiovascular:  Negative for chest pain.   Gastrointestinal:  Negative for abdominal pain, blood in stool, constipation, diarrhea, nausea, vomiting and reflux.   Genitourinary:  Negative for dysmenorrhea, dyspareunia, dysuria, hematuria, hot flashes, menorrhagia, menstrual problem, pelvic pain, vaginal bleeding, vaginal discharge, postcoital bleeding and vaginal dryness.   Musculoskeletal:  Negative for arthralgias and joint swelling.   Integumentary:  Negative for rash, hair changes, breast mass, nipple discharge and breast skin changes.   Psychiatric/Behavioral:  Negative for depression. The patient is not nervous/anxious.    Breast: Negative for asymmetry, lump, mass, nipple discharge and skin changes          Objective:     Vitals:    10/14/24 1008   BP: 136/84   Pulse: 87   Weight: 105.2 kg (232 lb)      Female chaperone present   Physical Exam:   Constitutional: She appears well-developed and well-nourished. No distress.    HENT:   Head: Normocephalic and atraumatic.    Eyes: Conjunctivae and EOM are normal.    Neck: No tracheal deviation present. No thyromegaly present.    Cardiovascular:       Exam reveals no clubbing, no cyanosis and no edema.        Pulmonary/Chest: Effort normal. No respiratory distress.        Abdominal: Soft. She exhibits no distension and no mass. There is no abdominal tenderness. There is no rebound and no guarding. No hernia.     Genitourinary:    Vagina, uterus and rectum normal.      Pelvic exam was performed with patient supine.   There is no rash, tenderness, lesion or injury on the right labia. There is no  rash, tenderness, lesion or injury on the left labia. Cervix is normal. Right adnexum displays no mass, no tenderness and no fullness. Left adnexum displays no mass, no tenderness and no fullness.                Skin: She is not diaphoretic. No cyanosis. Nails show no clubbing.         breast exam wnl- no nipple dc, skin changes, masses or lymph nodes palpated bilaterally    Assessment:        1. Encounter for gynecological examination without abnormal finding                Plan:       Pap  Talked about getting pregn

## 2024-10-16 LAB — Lab: NORMAL

## 2024-11-18 ENCOUNTER — PATIENT MESSAGE (OUTPATIENT)
Dept: OBSTETRICS AND GYNECOLOGY | Facility: CLINIC | Age: 39
End: 2024-11-18
Payer: COMMERCIAL

## 2024-11-18 DIAGNOSIS — K64.4 EXTERNAL HEMORRHOID: Primary | ICD-10-CM

## 2024-12-09 ENCOUNTER — OFFICE VISIT (OUTPATIENT)
Dept: SURGERY | Facility: CLINIC | Age: 39
End: 2024-12-09
Payer: COMMERCIAL

## 2024-12-09 DIAGNOSIS — K64.4 EXTERNAL HEMORRHOID: ICD-10-CM

## 2024-12-09 PROCEDURE — 99204 OFFICE O/P NEW MOD 45 MIN: CPT | Mod: S$PBB,,, | Performed by: SURGERY

## 2024-12-09 PROCEDURE — 1159F MED LIST DOCD IN RCRD: CPT | Mod: CPTII,,, | Performed by: SURGERY

## 2024-12-09 PROCEDURE — 1160F RVW MEDS BY RX/DR IN RCRD: CPT | Mod: CPTII,,, | Performed by: SURGERY

## 2024-12-09 NOTE — PROGRESS NOTES
Subjective:       Patient ID: Jenae Fierro is a 38 y.o. female.    Chief Complaint: Hemorrhoids (Pt c/o hemorrhoid x3 years. Denies any pain or bleeding.)      30-year-old female who has had an external hemorrhoid for approximately 3 years since giving birth, states that it has not gone away and would like to have it removed.  It is not describing pain at this time nor any bleeding.  Nothing seems to be making this better or worse.      Review of Systems   Constitutional:  Negative for chills and fever.   HENT:  Negative for nasal congestion and rhinorrhea.    Eyes:  Negative for discharge and visual disturbance.   Respiratory:  Negative for shortness of breath and wheezing.    Cardiovascular:  Negative for chest pain and palpitations.   Gastrointestinal: Negative.  Negative for abdominal distention, abdominal pain, anal bleeding, blood in stool, constipation, diarrhea, nausea, rectal pain and vomiting.   Endocrine: Negative for cold intolerance and heat intolerance.   Genitourinary:  Negative for difficulty urinating and frequency.   Musculoskeletal:  Negative for back pain and myalgias.   Integumentary:  Negative for pallor and rash.   Neurological:  Negative for dizziness and headaches.   Hematological:  Negative for adenopathy. Does not bruise/bleed easily.   Psychiatric/Behavioral:  Negative for behavioral problems. The patient is not nervous/anxious.          Objective:      Physical Exam  Constitutional:       Appearance: Normal appearance. She is well-developed.   HENT:      Head: Normocephalic and atraumatic.   Eyes:      General: Lids are normal.      Conjunctiva/sclera: Conjunctivae normal.   Neck:      Trachea: Trachea normal.   Cardiovascular:      Rate and Rhythm: Normal rate and regular rhythm.      Heart sounds: Normal heart sounds.   Pulmonary:      Effort: Pulmonary effort is normal.      Breath sounds: Normal breath sounds.   Abdominal:      General: Bowel sounds are normal. There is no  distension.      Palpations: Abdomen is soft.      Tenderness: There is no abdominal tenderness.      Hernia: No hernia is present.   Musculoskeletal:      Cervical back: Normal range of motion.   Skin:     General: Skin is warm and dry.   Neurological:      Mental Status: She is alert and oriented to person, place, and time.   Psychiatric:         Speech: Speech normal.         Behavior: Behavior normal. Behavior is cooperative.         Assessment:       Problem List Items Addressed This Visit    None  Visit Diagnoses       External hemorrhoid                Plan:       30-year-old female with an external hemorrhoid, the patient would like to go to the operating room for anal exam under anesthesia with possible hemorrhoidectomy.

## 2024-12-31 LAB
ABS NRBC COUNT: 0 THOU/UL (ref 0–0.01)
ABSOLUTE BASOPHIL: 0.1 10*3/UL (ref 0–0.3)
ABSOLUTE EOSINOPHIL: 0.3 10*3/UL (ref 0–0.6)
ABSOLUTE IMMATURE GRAN: 0.07 THOU/UL (ref 0–0.03)
ABSOLUTE LYMPHOCYTE: 3.4 10*3/UL (ref 1.2–4)
ABSOLUTE MONOCYTE: 0.8 10*3/UL (ref 0.1–0.8)
ANION GAP SERPL CALC-SCNC: 8 MMOL/L (ref 3–11)
BASOPHILS NFR BLD: 1.1 % (ref 0–3)
BUN SERPL-MCNC: 15 MG/DL (ref 7–18)
BUN/CREAT SERPL: 22.38 RATIO
CALCIUM SERPL-MCNC: 9.4 MG/DL (ref 8.5–10.1)
CHLORIDE SERPL-SCNC: 106 MMOL/L (ref 98–107)
CO2 SERPL-SCNC: 24 MMOL/L (ref 21–32)
CREAT SERPL-MCNC: 0.67 MG/DL (ref 0.55–1.02)
EOSINOPHIL NFR BLD: 3.9 % (ref 0–6)
ERYTHROCYTE [DISTWIDTH] IN BLOOD BY AUTOMATED COUNT: 11.9 % (ref 0–15.5)
GFR ESTIMATION: 114
GLUCOSE SERPL-MCNC: 126 MG/DL (ref 74–106)
HCT VFR BLD AUTO: 41.5 % (ref 37–47)
HGB BLD-MCNC: 13.7 G/DL (ref 12–16)
IMMATURE GRANULOCYTES: 0.8 % (ref 0–0.43)
LYMPHOCYTES NFR BLD: 39.9 % (ref 20–45)
MCH RBC QN AUTO: 29 PG (ref 27–32)
MCHC RBC AUTO-ENTMCNC: 33 % (ref 32–36)
MCV RBC AUTO: 87.7 FL (ref 80–99)
MONOCYTES NFR BLD: 9 % (ref 2–10)
NEUTROPHILS # BLD AUTO: 3.9 10*3/UL (ref 1.4–7)
NEUTROPHILS NFR BLD: 45.3 % (ref 50–80)
NUCLEATED RED BLOOD CELLS: 0 % (ref 0–0.2)
PLATELETS: 327 10*3/UL (ref 130–400)
PMV BLD AUTO: 9.6 FL (ref 9.2–12.2)
POTASSIUM SERPL-SCNC: 4.4 MMOL/L (ref 3.5–5.1)
RBC # BLD AUTO: 4.73 10*6/UL (ref 4.2–5.4)
SODIUM BLD-SCNC: 138 MMOL/L (ref 131–143)
WBC # BLD: 8.6 10*3/UL (ref 4.5–10)

## 2025-01-02 ENCOUNTER — OUTSIDE PLACE OF SERVICE (OUTPATIENT)
Dept: SURGERY | Facility: CLINIC | Age: 40
End: 2025-01-02

## 2025-01-02 LAB
B-HCG UR QL: NEGATIVE
GLUCOSE SERPL-MCNC: 139 MG/DL (ref 70–105)
GLUCOSE SERPL-MCNC: 156 MG/DL (ref 70–105)

## 2025-01-02 PROCEDURE — 46260 REMOVE IN/EX HEM GROUPS 2+: CPT | Mod: ,,, | Performed by: SURGERY

## 2025-01-03 ENCOUNTER — PATIENT MESSAGE (OUTPATIENT)
Dept: SURGERY | Facility: CLINIC | Age: 40
End: 2025-01-03
Payer: COMMERCIAL

## 2025-01-06 ENCOUNTER — TELEPHONE (OUTPATIENT)
Dept: SURGERY | Facility: CLINIC | Age: 40
End: 2025-01-06
Payer: COMMERCIAL

## 2025-01-06 DIAGNOSIS — K62.89 PAIN, RECTAL: Primary | ICD-10-CM

## 2025-01-06 RX ORDER — LIDOCAINE HYDROCHLORIDE 20 MG/ML
JELLY TOPICAL 2 TIMES DAILY
Qty: 30 ML | Refills: 0 | Status: SHIPPED | OUTPATIENT
Start: 2025-01-06 | End: 2025-01-13

## 2025-01-06 NOTE — TELEPHONE ENCOUNTER
----- Message from Cleo sent at 1/6/2025  8:53 AM CST -----  Contact: Jenae Emanuel needs a call back at [phone], Regards to some concerns she has about her recovery she doesn't feel as if it's   going as it should and just wants to speak with the office about it.    Thanks  Td

## 2025-01-06 NOTE — TELEPHONE ENCOUNTER
Pt called stating she was having extreme pain and pressure at surgical site. So much so that walking/moving was unbearable. Pt states she has been taking metamucil, colace, and miralax but recently had to stop taking miralax. Only gets relief from ice pack/warm shower then immediately in pain again. Pt states she's now alternating tylenol and ibuprofen for pain management. Dr. Shirley notified and gave the following orders: percocet 5/325mg 1 tab q8hrs prn severe pain #20 0RF, colace 100mg BID, and lidocaine jelly to site. VORB. Pt notified and verbalized understanding. Pt declined RX for percocet at this time.

## 2025-01-13 ENCOUNTER — OFFICE VISIT (OUTPATIENT)
Dept: SURGERY | Facility: CLINIC | Age: 40
End: 2025-01-13
Payer: COMMERCIAL

## 2025-01-13 ENCOUNTER — TELEPHONE (OUTPATIENT)
Dept: SURGERY | Facility: CLINIC | Age: 40
End: 2025-01-13

## 2025-01-13 DIAGNOSIS — K64.4 EXTERNAL HEMORRHOID: Primary | ICD-10-CM

## 2025-01-13 PROCEDURE — 1160F RVW MEDS BY RX/DR IN RCRD: CPT | Mod: CPTII,,, | Performed by: SURGERY

## 2025-01-13 PROCEDURE — 1159F MED LIST DOCD IN RCRD: CPT | Mod: CPTII,,, | Performed by: SURGERY

## 2025-01-13 PROCEDURE — 99024 POSTOP FOLLOW-UP VISIT: CPT | Mod: ,,, | Performed by: SURGERY

## 2025-01-13 NOTE — TELEPHONE ENCOUNTER
STD paperwork filled out and faxed to [place] [employer] per paperwork request [phone]. Paperwork also emailed to pt per request at [email or web address]. Email successfully went through.

## 2025-01-13 NOTE — PROGRESS NOTES
39-year-old female 2 weeks status post anal exam under anesthesia with hemorrhoidectomy.  Patient states she was having significant pain over the last couple weeks with some constipation, has been are aggressive bowel regimen and her constipation has resolved.  States that she is feeling better today and having less pain.    Suture lines are in place and intact, no significant dehiscence of wound noted, some edema otherwise healing well    Instructed patient to continue aggressive bowel regimen to avoid any constipation, may follow up my office in 4 weeks

## 2025-01-20 ENCOUNTER — PATIENT MESSAGE (OUTPATIENT)
Dept: SURGERY | Facility: CLINIC | Age: 40
End: 2025-01-20
Payer: COMMERCIAL

## 2025-02-17 ENCOUNTER — OFFICE VISIT (OUTPATIENT)
Dept: SURGERY | Facility: CLINIC | Age: 40
End: 2025-02-17
Payer: COMMERCIAL

## 2025-02-17 DIAGNOSIS — K64.4 EXTERNAL HEMORRHOID: Primary | ICD-10-CM

## 2025-02-17 NOTE — PROGRESS NOTES
Six weeks status post hemorrhoidectomy.  Patient is doing well with no complaints.  Incisions all well healed, no bleeding, no pain.  Instructed patient to refrain from getting constipated, may continue to take stool softeners.  Follow-up on a p.r.n. basis

## 2025-03-23 ENCOUNTER — PATIENT MESSAGE (OUTPATIENT)
Dept: OBSTETRICS AND GYNECOLOGY | Facility: CLINIC | Age: 40
End: 2025-03-23
Payer: COMMERCIAL

## 2025-03-24 DIAGNOSIS — Z34.81 ENCOUNTER FOR SUPERVISION OF NORMAL PREGNANCY IN MULTIGRAVIDA IN FIRST TRIMESTER: Primary | ICD-10-CM
